# Patient Record
Sex: MALE | Race: WHITE | HISPANIC OR LATINO | Employment: OTHER | URBAN - METROPOLITAN AREA
[De-identification: names, ages, dates, MRNs, and addresses within clinical notes are randomized per-mention and may not be internally consistent; named-entity substitution may affect disease eponyms.]

---

## 2018-05-18 ENCOUNTER — APPOINTMENT (OUTPATIENT)
Dept: RADIOLOGY | Facility: MEDICAL CENTER | Age: 63
End: 2018-05-18
Attending: EMERGENCY MEDICINE
Payer: MEDICARE

## 2018-05-18 ENCOUNTER — HOSPITAL ENCOUNTER (EMERGENCY)
Facility: MEDICAL CENTER | Age: 63
End: 2018-05-18
Attending: EMERGENCY MEDICINE
Payer: MEDICARE

## 2018-05-18 VITALS
SYSTOLIC BLOOD PRESSURE: 127 MMHG | RESPIRATION RATE: 18 BRPM | WEIGHT: 185.19 LBS | DIASTOLIC BLOOD PRESSURE: 74 MMHG | BODY MASS INDEX: 32.81 KG/M2 | HEIGHT: 63 IN | OXYGEN SATURATION: 96 % | TEMPERATURE: 97.6 F | HEART RATE: 78 BPM

## 2018-05-18 DIAGNOSIS — R11.0 NAUSEA: ICD-10-CM

## 2018-05-18 DIAGNOSIS — R10.13 EPIGASTRIC PAIN: ICD-10-CM

## 2018-05-18 LAB
ALBUMIN SERPL BCP-MCNC: 4.5 G/DL (ref 3.2–4.9)
ALBUMIN/GLOB SERPL: 1.4 G/DL
ALP SERPL-CCNC: 66 U/L (ref 30–99)
ALT SERPL-CCNC: 24 U/L (ref 2–50)
ANION GAP SERPL CALC-SCNC: 10 MMOL/L (ref 0–11.9)
AST SERPL-CCNC: 28 U/L (ref 12–45)
BASOPHILS # BLD AUTO: 0.4 % (ref 0–1.8)
BASOPHILS # BLD: 0.02 K/UL (ref 0–0.12)
BILIRUB SERPL-MCNC: 0.5 MG/DL (ref 0.1–1.5)
BUN SERPL-MCNC: 11 MG/DL (ref 8–22)
CALCIUM SERPL-MCNC: 9.6 MG/DL (ref 8.4–10.2)
CHLORIDE SERPL-SCNC: 103 MMOL/L (ref 96–112)
CO2 SERPL-SCNC: 25 MMOL/L (ref 20–33)
CREAT SERPL-MCNC: 0.88 MG/DL (ref 0.5–1.4)
EKG IMPRESSION: NORMAL
EOSINOPHIL # BLD AUTO: 0.17 K/UL (ref 0–0.51)
EOSINOPHIL NFR BLD: 3 % (ref 0–6.9)
ERYTHROCYTE [DISTWIDTH] IN BLOOD BY AUTOMATED COUNT: 39.6 FL (ref 35.9–50)
GLOBULIN SER CALC-MCNC: 3.3 G/DL (ref 1.9–3.5)
GLUCOSE SERPL-MCNC: 145 MG/DL (ref 65–99)
HCT VFR BLD AUTO: 43.9 % (ref 42–52)
HGB BLD-MCNC: 15.8 G/DL (ref 14–18)
IMM GRANULOCYTES # BLD AUTO: 0.01 K/UL (ref 0–0.11)
IMM GRANULOCYTES NFR BLD AUTO: 0.2 % (ref 0–0.9)
LIPASE SERPL-CCNC: 32 U/L (ref 7–58)
LYMPHOCYTES # BLD AUTO: 2.43 K/UL (ref 1–4.8)
LYMPHOCYTES NFR BLD: 42.9 % (ref 22–41)
MCH RBC QN AUTO: 31.7 PG (ref 27–33)
MCHC RBC AUTO-ENTMCNC: 36 G/DL (ref 33.7–35.3)
MCV RBC AUTO: 88 FL (ref 81.4–97.8)
MONOCYTES # BLD AUTO: 0.39 K/UL (ref 0–0.85)
MONOCYTES NFR BLD AUTO: 6.9 % (ref 0–13.4)
NEUTROPHILS # BLD AUTO: 2.65 K/UL (ref 1.82–7.42)
NEUTROPHILS NFR BLD: 46.6 % (ref 44–72)
NRBC # BLD AUTO: 0 K/UL
NRBC BLD-RTO: 0 /100 WBC
PLATELET # BLD AUTO: 199 K/UL (ref 164–446)
PMV BLD AUTO: 8.4 FL (ref 9–12.9)
POTASSIUM SERPL-SCNC: 3.9 MMOL/L (ref 3.6–5.5)
PROT SERPL-MCNC: 7.8 G/DL (ref 6–8.2)
RBC # BLD AUTO: 4.99 M/UL (ref 4.7–6.1)
SODIUM SERPL-SCNC: 138 MMOL/L (ref 135–145)
TROPONIN I SERPL-MCNC: <0.02 NG/ML (ref 0–0.04)
WBC # BLD AUTO: 5.7 K/UL (ref 4.8–10.8)

## 2018-05-18 PROCEDURE — 85025 COMPLETE CBC W/AUTO DIFF WBC: CPT

## 2018-05-18 PROCEDURE — 70450 CT HEAD/BRAIN W/O DYE: CPT

## 2018-05-18 PROCEDURE — 93005 ELECTROCARDIOGRAM TRACING: CPT | Performed by: EMERGENCY MEDICINE

## 2018-05-18 PROCEDURE — 71045 X-RAY EXAM CHEST 1 VIEW: CPT

## 2018-05-18 PROCEDURE — 84484 ASSAY OF TROPONIN QUANT: CPT

## 2018-05-18 PROCEDURE — 83690 ASSAY OF LIPASE: CPT

## 2018-05-18 PROCEDURE — 99284 EMERGENCY DEPT VISIT MOD MDM: CPT

## 2018-05-18 PROCEDURE — 36415 COLL VENOUS BLD VENIPUNCTURE: CPT

## 2018-05-18 PROCEDURE — 80053 COMPREHEN METABOLIC PANEL: CPT

## 2018-05-18 RX ORDER — FINASTERIDE 5 MG/1
5 TABLET, FILM COATED ORAL EVERY EVENING
Status: SHIPPED | COMMUNITY
End: 2021-10-20

## 2018-05-18 RX ORDER — RANITIDINE 150 MG/1
150 TABLET ORAL DAILY
Status: SHIPPED | COMMUNITY
End: 2021-04-13

## 2018-05-18 RX ORDER — BUSPIRONE HYDROCHLORIDE 10 MG/1
10 TABLET ORAL 3 TIMES DAILY PRN
COMMUNITY

## 2018-05-18 RX ORDER — LISINOPRIL 5 MG/1
5 TABLET ORAL DAILY
COMMUNITY

## 2018-05-18 RX ORDER — SUCRALFATE 1 G/1
1 TABLET ORAL
Qty: 120 TAB | Refills: 0 | Status: SHIPPED | OUTPATIENT
Start: 2018-05-18 | End: 2021-10-20

## 2018-05-18 RX ORDER — DEXLANSOPRAZOLE 30 MG/1
30 CAPSULE, DELAYED RELEASE ORAL DAILY
Qty: 30 CAP | Refills: 0 | Status: SHIPPED | OUTPATIENT
Start: 2018-05-18 | End: 2021-10-20

## 2018-05-18 RX ORDER — ATORVASTATIN CALCIUM 40 MG/1
40 TABLET, FILM COATED ORAL NIGHTLY
COMMUNITY

## 2018-05-18 ASSESSMENT — PAIN SCALES - GENERAL
PAINLEVEL_OUTOF10: 3
PAINLEVEL_OUTOF10: 0

## 2018-05-18 NOTE — ED PROVIDER NOTES
ED Provider Note    CHIEF COMPLAINT  Chief Complaint   Patient presents with   • Nausea   • Headache       HPI  Kenyon Nunez is a 62 y.o. male who presents complaining of nausea and headache. The patient's been having some burning sensation in his chest and associated nausea for about 3 weeks. Initially saw his PCP for this and was diagnosed with GERD. He's been trying some Prilosec which is not really helping. He continues to have a nauseated feeling. He denies any other chest pain. He has no belly pain. Does not have any exertional symptoms. There is no shortness of breath or cough. Starting yesterday he started having a headache as well. This is gradually worsening, making his nausea worse. He denies any fever or recent illness. No change in bowel or bladder. His daughter thinks that he is little bit confused, he thinks he is fine. There is no other complaint.    PAST MEDICAL HISTORY  Past Medical History:   Diagnosis Date   • Backpain    • Diabetes    • Hyperlipidemia    • Hypertension    • Type II or unspecified type diabetes mellitus without mention of complication, uncontrolled 5/18/2010   • Vitiligo        FAMILY HISTORY  Family History   Problem Relation Age of Onset   • Diabetes Mother    • Hypertension Mother    • Psychiatry Father    • Diabetes Sister    • Diabetes Brother        SOCIAL HISTORY  Social History   Substance Use Topics   • Smoking status: Former Smoker     Years: 16.00     Types: Cigarettes     Quit date: 1/1/1990   • Smokeless tobacco: Never Used   • Alcohol use No         SURGICAL HISTORY  History reviewed. No pertinent surgical history.    CURRENT MEDICATIONS    I have reviewed the nurses notes and/or the list brought with the patient.    ALLERGIES  Allergies   Allergen Reactions   • Nkda [No Known Drug Allergy]        REVIEW OF SYSTEMS  See HPI for further details. Review of systems as above, otherwise all other systems are negative.     PHYSICAL EXAM  VITAL SIGNS: /81   Pulse 80   " Temp 36.4 °C (97.6 °F)   Resp 18   Ht 1.6 m (5' 3\") Comment: sTATED  Wt 84 kg (185 lb 3 oz)   SpO2 98%   BMI 32.80 kg/m²   Constitutional: Well appearing patient in no acute distress.  Not toxic, nor ill in appearance.  HENT: Mucus membranes moist.  Oropharynx is clear.  Eyes: Pupils equally round.  No scleral icterus.   Neck: Full nontender range of motion.  Lymphatic: No cervical lymphadenopathy noted.   Cardiovascular: Regular heart rate and rhythm.  No murmurs, rubs, nor gallop appreciated.   Thorax & Lungs: Chest is nontender.  Lungs are clear to auscultation with good air movement bilaterally.  No wheeze, rhonchi, nor rales.   Abdomen: Soft, with no tenderness, rebound nor guarding.  No mass, pulsatile mass, nor hepatosplenomegaly appreciated.  Skin: No purpura nor petechia noted.  Extremities/Musculoskeletal: No sign of trauma.  Calves are nontender with no cords nor edema.  No Bobbi's sign.  Pulses are intact all around.   Neurologic: Alert & oriented.  Strength and sensation is intact all around.  Gait is normal.  Psychiatric: Normal affect appropriate for the clinical situation.    EKG  I interpreted this EKG myself.  This is a 12-lead study.  The rhythm is sinus with a rate of 91.  There are no ST segment nor T wave abnormalities.  Interpretation: No ST segment elevation myocardial infarction.    LABS  Labs Reviewed   CBC WITH DIFFERENTIAL - Abnormal; Notable for the following:        Result Value    MCHC 36.0 (*)     MPV 8.4 (*)     Lymphocytes 42.90 (*)     All other components within normal limits   COMP METABOLIC PANEL - Abnormal; Notable for the following:     Glucose 145 (*)     All other components within normal limits   LIPASE   TROPONIN   ESTIMATED GFR         RADIOLOGY/PROCEDURES  I have reviewed the patient's film interpretations myself, and they are read out by the radiologist as:   DX-CHEST-PORTABLE (1 VIEW)   Final Result      No acute cardiopulmonary findings.      CT-HEAD W/O "   Final Result      1.  No acute intracranial findings.      2.  Chronic maxillary and ethmoid sinus disease.      3.  Mild periventricular white matter change, likely related to chronic small vessel disease.           .    MEDICAL RECORD  I have reviewed patient's medical record and pertinent results are listed above.    COURSE & MEDICAL DECISION MAKING  I have reviewed any medical record information, laboratory studies and radiographic results as noted above.  This patient presents with some epigastric discomfort and burning. His abdomen is completely benign. There is no evidence of pancreatitis or hepatitis. His exam is inconsistent with cholecystitis. There is no leukocytosis or shift, no renal insufficiency. Chest x-ray shows no evidence of infiltrate, no extra alveolar air/mediastinal air. Given his headache now with that nausea, I did obtain a CT scan looking for mass effect. There is not. Also, obviously consideration for cardiac etiology. However with weeks of symptoms and negative troponin, I feel that a cardiac etiology is ruled out. At this point, I would like him to change to Dexilant and Carafate as he is failing Prilosec and Zantac. Written prescriptions for these. A bike and a follow-up next week with his PCP for recheck. I would also like him to establish with GI, he was given a referral for [facility]. Vascular: Monday to make an appointment. He is given instructions on GERD.      FINAL IMPRESSION  1. Nausea    2. Epigastric pain           This dictation was created using voice recognition software.    Electronically signed by: Orlin Gaxiola, 5/18/2018 4:15 PM

## 2018-05-18 NOTE — ED NOTES
"PT C/O intermittent nausea with \"GERD\" for the past 3 weeks with H/A since yesterday. He has been seen by his \"primary\" in the interim with a preliminary diagnosis of gastric reflux.   "

## 2018-05-19 NOTE — ED NOTES
Med rec updated and complete  Allergies reviewed  Interviewed pt with family at bedside with permission from pt.  Pt reports no antibiotics in the last 30 days.  Pt reports that he stop taking the RANITIDINE 150MG 3 days again, because he thought it was making him very sick.

## 2018-05-19 NOTE — DISCHARGE INSTRUCTIONS
Start Dexilant and Carafate (stop Prilosec and Zantac).  Consider elevating the head of the bed a little.  Follow up next week with your PCP. I also want you to establish with GI--call Monday to make appt.    Gastroesophageal Reflux Disease, Adult  Normally, food travels down the esophagus and stays in the stomach to be digested. However, when a person has gastroesophageal reflux disease (GERD), food and stomach acid move back up into the esophagus. When this happens, the esophagus becomes sore and inflamed. Over time, GERD can create small holes (ulcers) in the lining of the esophagus.  What are the causes?  This condition is caused by a problem with the muscle between the esophagus and the stomach (lower esophageal sphincter, or LES). Normally, the LES muscle closes after food passes through the esophagus to the stomach. When the LES is weakened or abnormal, it does not close properly, and that allows food and stomach acid to go back up into the esophagus. The LES can be weakened by certain dietary substances, medicines, and medical conditions, including:  · Tobacco use.  · Pregnancy.  · Having a hiatal hernia.  · Heavy alcohol use.  · Certain foods and beverages, such as coffee, chocolate, onions, and peppermint.  What increases the risk?  This condition is more likely to develop in:  · People who have an increased body weight.  · People who have connective tissue disorders.  · People who use NSAID medicines.  What are the signs or symptoms?  Symptoms of this condition include:  · Heartburn.  · Difficult or painful swallowing.  · The feeling of having a lump in the throat.  · A bitter taste in the mouth.  · Bad breath.  · Having a large amount of saliva.  · Having an upset or bloated stomach.  · Belching.  · Chest pain.  · Shortness of breath or wheezing.  · Ongoing (chronic) cough or a night-time cough.  · Wearing away of tooth enamel.  · Weight loss.  Different conditions can cause chest pain. Make sure to see  your health care provider if you experience chest pain.  How is this diagnosed?  Your health care provider will take a medical history and perform a physical exam. To determine if you have mild or severe GERD, your health care provider may also monitor how you respond to treatment. You may also have other tests, including:  · An endoscopy to examine your stomach and esophagus with a small camera.  · A test that measures the acidity level in your esophagus.  · A test that measures how much pressure is on your esophagus.  · A barium swallow or modified barium swallow to show the shape, size, and functioning of your esophagus.  How is this treated?  The goal of treatment is to help relieve your symptoms and to prevent complications. Treatment for this condition may vary depending on how severe your symptoms are. Your health care provider may recommend:  · Changes to your diet.  · Medicine.  · Surgery.  Follow these instructions at home:  Diet  · Follow a diet as recommended by your health care provider. This may involve avoiding foods and drinks such as:  ¨ Coffee and tea (with or without caffeine).  ¨ Drinks that contain alcohol.  ¨ Energy drinks and sports drinks.  ¨ Carbonated drinks or sodas.  ¨ Chocolate and cocoa.  ¨ Peppermint and mint flavorings.  ¨ Garlic and onions.  ¨ Horseradish.  ¨ Spicy and acidic foods, including peppers, chili powder, solis powder, vinegar, hot sauces, and barbecue sauce.  ¨ Citrus fruit juices and citrus fruits, such as oranges, erma, and limes.  ¨ Tomato-based foods, such as red sauce, chili, salsa, and pizza with red sauce.  ¨ Fried and fatty foods, such as donuts, french fries, potato chips, and high-fat dressings.  ¨ High-fat meats, such as hot dogs and fatty cuts of red and white meats, such as rib eye steak, sausage, ham, and scott.  ¨ High-fat dairy items, such as whole milk, butter, and cream cheese.  · Eat small, frequent meals instead of large meals.  · Avoid drinking large  amounts of liquid with your meals.  · Avoid eating meals during the 2-3 hours before bedtime.  · Avoid lying down right after you eat.  · Do not exercise right after you eat.  General instructions  · Pay attention to any changes in your symptoms.  · Take over-the-counter and prescription medicines only as told by your health care provider. Do not take aspirin, ibuprofen, or other NSAIDs unless your health care provider told you to do so.  · Do not use any tobacco products, including cigarettes, chewing tobacco, and e-cigarettes. If you need help quitting, ask your health care provider.  · Wear loose-fitting clothing. Do not wear anything tight around your waist that causes pressure on your abdomen.  · Raise (elevate) the head of your bed 6 inches (15cm).  · Try to reduce your stress, such as with yoga or meditation. If you need help reducing stress, ask your health care provider.  · If you are overweight, reduce your weight to an amount that is healthy for you. Ask your health care provider for guidance about a safe weight loss goal.  · Keep all follow-up visits as told by your health care provider. This is important.  Contact a health care provider if:  · You have new symptoms.  · You have unexplained weight loss.  · You have difficulty swallowing, or it hurts to swallow.  · You have wheezing or a persistent cough.  · Your symptoms do not improve with treatment.  · You have a hoarse voice.  Get help right away if:  · You have pain in your arms, neck, jaw, teeth, or back.  · You feel sweaty, dizzy, or light-headed.  · You have chest pain or shortness of breath.  · You vomit and your vomit looks like blood or coffee grounds.  · You faint.  · Your stool is bloody or black.  · You cannot swallow, drink, or eat.  This information is not intended to replace advice given to you by your health care provider. Make sure you discuss any questions you have with your health care provider.  Document Released: 09/27/2006 Document  Revised: 05/17/2017 Document Reviewed: 04/13/2016  [employer] Interactive Patient Education © 2017 [employer]    Enfermedad por reflujo gastroesofágico en los adultos  (Gastroesophageal Reflux Disease, Adult)  Normalmente, los alimentos descienden por el esófago y se depositan en el estómago para mace digestión. Sin embargo, cuando cristofer persona tiene enfermedad por reflujo gastroesofágico (ERGE), los alimentos y el ácido estomacal regresan al esófago. Cuando esto ocurre, el esófago se irrita y se inflama. Con el tiempo, la ERGE puede provocar la formación de pequeñas perforaciones (úlceras) en la mucosa del esófago.  CAUSAS  Un problema del músculo que se encuentra entre el esófago y el estómago (esfínter esofágico inferior o EEI) es la causa de esta enfermedad. Por lo general, el esfínter esofágico inferior se ruslan después de que los alimentos pasan a través del esófago hacia el estómago. Cuando el EEI está debilitado o no es normal, no se ruslan correctamente, lo que permite el paso retrógrado de los alimentos y el ácido estomacal al esófago. Algunas sustancias de la dieta, algunos medicamentos y ciertas enfermedades pueden debilitar michael esfínter, entre ellos:  · Consumo de tabaco.  · Embarazo.  · Hernia de hiato.  · Consumo excesivo de alcohol.  · Algunos alimentos y ciertas bebidas, pardeep el café, el chocolate, las cebollas y la menta.  FACTORES DE RIESGO  Es más probable que esta afección se manifieste en:  · Los personas con sobrepeso.  · Las personas con trastornos del tejido conjuntivo.  · Las personas que viktoriya antiinflamatorios no esteroides (MARIOLA).  SÍNTOMAS  Los síntomas de esta afección incluyen lo siguiente:  · Acidez estomacal.  · Dificultad o dolor al tragar.  · Sensación de tener un bulto en la garganta.  · Sabor amargo en la boca.  · Mal aliento.  · Gran cantidad de saliva.  · Malestar estomacal o meteorismo.  · Flatulencias.  · Dolor en el pecho.  · Falta de aire o sibilancias.  · Tos permanente  (crónica) o tos nocturna.  · Desgaste el esmalte dental.  · Pérdida de peso.  El dolor en el pecho puede deberse a muchas afecciones diferentes. Consulte al médico si tiene dolor en el pecho.  DIAGNÓSTICO  El médico le hará cristofer historia clínica y un examen físico. Para determinar si la ERGE es leve o grave, el médico también puede controlar la respuesta al tratamiento. También pueden hacerle otros estudios, por ejemplo:  · Cristofer endoscopia para examinar el estómago y el esófago con cristofer pequeña cámara.  · Un estudio que determina el nivel de acidez en el esófago.  · Un estudio que mide la presión que hay en el esófago.  · Un estudio de deglución de bario o un estudio modificado de deglución de bario para mostrar la forma, el tamaño y el funcionamiento del esófago.  TRATAMIENTO  El objetivo del tratamiento es aliviar los síntomas y evitar las complicaciones. El tratamiento de esta afección puede variar en función de la gravedad de los síntomas. El médico podrá indicar lo siguiente:  · Cambios en la dieta.  · Medicamentos.  · Cirugía.  INSTRUCCIONES PARA EL CUIDADO EN EL HOGAR  Dieta   · Siga la dieta que le haya recomendado el médico, la cual puede incluir evitar alimentos y bebidas tales pardeep:  ¨ Café y té (con o sin cafeína).  ¨ Bebidas que contengan alcohol.  ¨ Bebidas energizantes y deportivas.  ¨ Gaseosas o refrescos.  ¨ Chocolate y cacao.  ¨ Menta y esencias de menta.  ¨ [place] y cebollas.  ¨ Rábano picante.  ¨ Alimentos muy condimentados y ácidos, entre ellos, pimientos, chile en polvo, solis en polvo, vinagre, salsas picantes y salsa barbacoa.  ¨ Frutas cítricas y janae jugos, pardeep naranjas, teagan y leny.  ¨ Alimentos a base de tomates, pardeep salsa kody, chile, salsa y pizza con salsa kody.  ¨ Alimentos fritos y grasos, pardeep rosquillas, pam fritas y aderezos con alto contenido de grasa.  ¨ Trev con alto contenido de grasa, pardeep hot dogs y baldwin grasos de trev sanon y hiren, por ejemplo, filetes de  entrecot, salchicha, jamón y tocino.  ¨ Productos lácteos con alto contenido de grasa, pardeep leche entera, mantequilla y queso crema.  · Liseth comidas pequeñas y frecuentes patricia el día en lugar de comidas abundantes.  · Evite beber mucho líquido con las comidas.  · No coma patricia las 2 o 3 horas previas a la hora de acostarse.  · No se acueste inmediatamente después de comer.  · No liseth actividad física enseguida después de comer.  Instrucciones generales   · Esté atento a cualquier cambio en los síntomas.  · [place] los medicamentos de venta oz y los recetados solamente pardeep se lo haya indicado el médico. No tome aspirina, ibuprofeno ni otros antiinflamatorios no esteroides (MARIOLA), a menos que se lo haya indicado el médico.  · No consuma ningún producto que contenga tabaco, lo que incluye cigarrillos, tabaco de mascar y cigarrillos electrónicos. Si necesita ayuda para dejar de fumar, consulte al médico.  · Use ropas sueltas. No use prendas ajustadas alrededor de la cintura que ejerzan presión en el abdomen.  · Levante (eleve) 6 pulgadas (15 centímetros) la cabecera de la cama.  · Trate de reducir el nivel de estrés con actividades pardeep el yoga o la meditación. Si necesita ayuda para reducir el nivel de estrés, consulte al médico.  · Si tiene sobrepeso, adelgace hasta alcanzar un peso saludable. Hable con el médico acerca de mace peso ideal y pídale asesoramiento en cuanto a la dieta que debe seguir para poder alcanzarlo.  · Concurra a todas las visitas de control pardeep se lo haya indicado el médico. [place] es importante.  SOLICITE ATENCIÓN MÉDICA SI:  · Aparecen nuevos síntomas.  · [place] de peso sin causa aparente.  · Tiene dificultad para tragar o siente dolor al hacerlo.  · Tiene sibilancias o tos persistente.  · Los síntomas no mejoran con el tratamiento.  · Tiene la voz ronca.  SOLICITE ATENCIÓN MÉDICA DE INMEDIATO SI:  · Tiene dolor en los brazos, el argelia, los maxilares, la dentadura o la espalda.  · Transpira,  se marea o tiene sensación de desvanecimiento.  · Siente falta de aire o dolor en el pecho.  · Vomita y el vómito es parecido a la amanda o a los granos de café.  · Se desmaya.  · Las heces son sanguinolentas o de color jose c.  · No puede tragar, beber o comer.  Esta información no tiene pardeep fin reemplazar el consejo del médico. Asegúrese de hacerle al médico cualquier pregunta que tenga.  Document Released: 09/27/2006 Document Revised: 09/07/2016 Document Reviewed: 04/13/2016  [employer] Interactive Patient Education © 2017 [employer]

## 2021-03-03 DIAGNOSIS — Z23 NEED FOR VACCINATION: ICD-10-CM

## 2021-04-02 ENCOUNTER — APPOINTMENT (OUTPATIENT)
Dept: RADIOLOGY | Facility: MEDICAL CENTER | Age: 66
End: 2021-04-02
Attending: EMERGENCY MEDICINE
Payer: MEDICARE

## 2021-04-02 ENCOUNTER — HOSPITAL ENCOUNTER (EMERGENCY)
Facility: MEDICAL CENTER | Age: 66
End: 2021-04-02
Attending: EMERGENCY MEDICINE
Payer: MEDICARE

## 2021-04-02 VITALS
TEMPERATURE: 97.3 F | WEIGHT: 181.44 LBS | OXYGEN SATURATION: 93 % | HEART RATE: 71 BPM | RESPIRATION RATE: 18 BRPM | HEIGHT: 63 IN | DIASTOLIC BLOOD PRESSURE: 79 MMHG | SYSTOLIC BLOOD PRESSURE: 148 MMHG | BODY MASS INDEX: 32.15 KG/M2

## 2021-04-02 DIAGNOSIS — K57.90 DIVERTICULOSIS: ICD-10-CM

## 2021-04-02 DIAGNOSIS — R74.8 ELEVATED LIPASE: ICD-10-CM

## 2021-04-02 DIAGNOSIS — R10.30 LOWER ABDOMINAL PAIN: ICD-10-CM

## 2021-04-02 LAB
ALBUMIN SERPL BCP-MCNC: 4.5 G/DL (ref 3.2–4.9)
ALBUMIN/GLOB SERPL: 1.5 G/DL
ALP SERPL-CCNC: 87 U/L (ref 30–99)
ALT SERPL-CCNC: 22 U/L (ref 2–50)
ANION GAP SERPL CALC-SCNC: 14 MMOL/L (ref 7–16)
APPEARANCE UR: ABNORMAL
AST SERPL-CCNC: 17 U/L (ref 12–45)
BACTERIA #/AREA URNS HPF: NEGATIVE /HPF
BASOPHILS # BLD AUTO: 0.5 % (ref 0–1.8)
BASOPHILS # BLD: 0.03 K/UL (ref 0–0.12)
BILIRUB SERPL-MCNC: 0.3 MG/DL (ref 0.1–1.5)
BILIRUB UR QL STRIP.AUTO: NEGATIVE
BUN SERPL-MCNC: 17 MG/DL (ref 8–22)
CALCIUM SERPL-MCNC: 9.7 MG/DL (ref 8.5–10.5)
CHLORIDE SERPL-SCNC: 102 MMOL/L (ref 96–112)
CO2 SERPL-SCNC: 22 MMOL/L (ref 20–33)
COLOR UR: YELLOW
CREAT SERPL-MCNC: 0.81 MG/DL (ref 0.5–1.4)
EKG IMPRESSION: NORMAL
EOSINOPHIL # BLD AUTO: 0.14 K/UL (ref 0–0.51)
EOSINOPHIL NFR BLD: 2.2 % (ref 0–6.9)
EPI CELLS #/AREA URNS HPF: NEGATIVE /HPF
ERYTHROCYTE [DISTWIDTH] IN BLOOD BY AUTOMATED COUNT: 42.4 FL (ref 35.9–50)
GLOBULIN SER CALC-MCNC: 3.1 G/DL (ref 1.9–3.5)
GLUCOSE SERPL-MCNC: 104 MG/DL (ref 65–99)
GLUCOSE UR STRIP.AUTO-MCNC: >=1000 MG/DL
HCT VFR BLD AUTO: 47.9 % (ref 42–52)
HGB BLD-MCNC: 16.4 G/DL (ref 14–18)
HYALINE CASTS #/AREA URNS LPF: ABNORMAL /LPF
IMM GRANULOCYTES # BLD AUTO: 0.03 K/UL (ref 0–0.11)
IMM GRANULOCYTES NFR BLD AUTO: 0.5 % (ref 0–0.9)
KETONES UR STRIP.AUTO-MCNC: ABNORMAL MG/DL
LEUKOCYTE ESTERASE UR QL STRIP.AUTO: NEGATIVE
LIPASE SERPL-CCNC: 246 U/L (ref 11–82)
LYMPHOCYTES # BLD AUTO: 2.18 K/UL (ref 1–4.8)
LYMPHOCYTES NFR BLD: 34.6 % (ref 22–41)
MCH RBC QN AUTO: 31.4 PG (ref 27–33)
MCHC RBC AUTO-ENTMCNC: 34.2 G/DL (ref 33.7–35.3)
MCV RBC AUTO: 91.8 FL (ref 81.4–97.8)
MICRO URNS: ABNORMAL
MONOCYTES # BLD AUTO: 0.46 K/UL (ref 0–0.85)
MONOCYTES NFR BLD AUTO: 7.3 % (ref 0–13.4)
NEUTROPHILS # BLD AUTO: 3.46 K/UL (ref 1.82–7.42)
NEUTROPHILS NFR BLD: 54.9 % (ref 44–72)
NITRITE UR QL STRIP.AUTO: NEGATIVE
NRBC # BLD AUTO: 0 K/UL
NRBC BLD-RTO: 0 /100 WBC
PH UR STRIP.AUTO: 5 [PH] (ref 5–8)
PLATELET # BLD AUTO: 195 K/UL (ref 164–446)
PMV BLD AUTO: 8.6 FL (ref 9–12.9)
POTASSIUM SERPL-SCNC: 4.1 MMOL/L (ref 3.6–5.5)
PROT SERPL-MCNC: 7.6 G/DL (ref 6–8.2)
PROT UR QL STRIP: NEGATIVE MG/DL
RBC # BLD AUTO: 5.22 M/UL (ref 4.7–6.1)
RBC # URNS HPF: ABNORMAL /HPF
RBC UR QL AUTO: NEGATIVE
SODIUM SERPL-SCNC: 138 MMOL/L (ref 135–145)
SP GR UR STRIP.AUTO: 1.04
TROPONIN T SERPL-MCNC: 11 NG/L (ref 6–19)
UROBILINOGEN UR STRIP.AUTO-MCNC: 0.2 MG/DL
WBC # BLD AUTO: 6.3 K/UL (ref 4.8–10.8)
WBC #/AREA URNS HPF: ABNORMAL /HPF

## 2021-04-02 PROCEDURE — 81001 URINALYSIS AUTO W/SCOPE: CPT

## 2021-04-02 PROCEDURE — 93005 ELECTROCARDIOGRAM TRACING: CPT | Performed by: EMERGENCY MEDICINE

## 2021-04-02 PROCEDURE — 99284 EMERGENCY DEPT VISIT MOD MDM: CPT

## 2021-04-02 PROCEDURE — 36415 COLL VENOUS BLD VENIPUNCTURE: CPT

## 2021-04-02 PROCEDURE — 83690 ASSAY OF LIPASE: CPT

## 2021-04-02 PROCEDURE — 74177 CT ABD & PELVIS W/CONTRAST: CPT | Mod: MG

## 2021-04-02 PROCEDURE — 85025 COMPLETE CBC W/AUTO DIFF WBC: CPT

## 2021-04-02 PROCEDURE — 80053 COMPREHEN METABOLIC PANEL: CPT

## 2021-04-02 PROCEDURE — A9270 NON-COVERED ITEM OR SERVICE: HCPCS | Performed by: EMERGENCY MEDICINE

## 2021-04-02 PROCEDURE — 84484 ASSAY OF TROPONIN QUANT: CPT

## 2021-04-02 PROCEDURE — 700117 HCHG RX CONTRAST REV CODE 255: Performed by: EMERGENCY MEDICINE

## 2021-04-02 PROCEDURE — 700102 HCHG RX REV CODE 250 W/ 637 OVERRIDE(OP): Performed by: EMERGENCY MEDICINE

## 2021-04-02 PROCEDURE — 93005 ELECTROCARDIOGRAM TRACING: CPT

## 2021-04-02 RX ORDER — DICYCLOMINE HCL 20 MG
20 TABLET ORAL 4 TIMES DAILY PRN
Qty: 30 TABLET | Refills: 0 | Status: SHIPPED | OUTPATIENT
Start: 2021-04-02 | End: 2021-10-20

## 2021-04-02 RX ORDER — OXYCODONE AND ACETAMINOPHEN 10; 325 MG/1; MG/1
1 TABLET ORAL ONCE
Status: COMPLETED | OUTPATIENT
Start: 2021-04-02 | End: 2021-04-02

## 2021-04-02 RX ORDER — AMOXICILLIN AND CLAVULANATE POTASSIUM 875; 125 MG/1; MG/1
1 TABLET, FILM COATED ORAL 2 TIMES DAILY
Qty: 14 TABLET | Refills: 0 | Status: SHIPPED | OUTPATIENT
Start: 2021-04-02 | End: 2021-04-09

## 2021-04-02 RX ADMIN — OXYCODONE HYDROCHLORIDE AND ACETAMINOPHEN 1 TABLET: 10; 325 TABLET ORAL at 16:08

## 2021-04-02 RX ADMIN — IOHEXOL 90 ML: 350 INJECTION, SOLUTION INTRAVENOUS at 17:30

## 2021-04-02 NOTE — ED PROVIDER NOTES
ED Provider Note    ED Provider Note    Primary care provider: Yan Powell D.O.  Means of arrival: Walk-in  History obtained from: Patient via the translation tablet    CHIEF COMPLAINT  Chief Complaint   Patient presents with   • Abdominal Pain     x 2 wks    • Groin Pain   • Nausea   • Urinary Frequency     Seen at 3:44 PM.   HPI  Kenyon Hewitt is a 65 y.o. male with diabetes, history of MI, CVA (per patient) COVID-19 infection treated in [place] in 2020 presents with abdominal pain.  The patient notes for 2 weeks intermittent abdominal pain that ranges from mild to severe, it begins in the periumbilical region, spreads down to the upper groin and occasionally radiates to the bilateral flanks.  The pain is currently present in the periumbilical region, the patient does not note any modifying factors to this.  Over this time the patient has not had dysuria as mentioned in the nursing note, rather when he urinates this causes some increased pain in the periumbilical region.    The patient denies any headache, sore throat, neck pain.  He has had chest pain for the past 3 days, this is intermittent lasting for about 5 to 15 minutes at a time, sometimes associated with lightheadedness.  He denies any shortness of breath.  He occasionally has nausea but has not had any vomiting.  He denies any diarrhea.  He denies any numbness or focal weakness.    All of the history is obtained the translation tablet, therefore there may be some nuances that were missed due to this..    REVIEW OF SYSTEMS  See HPI,   Remainder of ROS negative.     PAST MEDICAL HISTORY   has a past medical history of Backpain, Diabetes, Hyperlipidemia, Hypertension, Type II or unspecified type diabetes mellitus without mention of complication, uncontrolled (5/18/2010), and Vitiligo.    SURGICAL HISTORY  patient denies any surgical history    SOCIAL HISTORY  Social History     Tobacco Use   • Smoking status: Former Smoker     Years: 16.00  "    Types: Cigarettes     Quit date: 1990     Years since quittin.2   • Smokeless tobacco: Never Used   Substance Use Topics   • Alcohol use: No   • Drug use: No      Social History     Substance and Sexual Activity   Drug Use No       FAMILY HISTORY  Family History   Problem Relation Age of Onset   • Diabetes Mother    • Hypertension Mother    • Psychiatric Illness Father    • Diabetes Sister    • Diabetes Brother        CURRENT MEDICATIONS  Reviewed.  See Encounter Summary.     ALLERGIES  Allergies   Allergen Reactions   • Nkda [No Known Drug Allergy]        PHYSICAL EXAM  VITAL SIGNS: /69   Pulse 71   Temp 36.2 °C (97.1 °F) (Temporal)   Resp 19   Ht 1.6 m (5' 3\")   Wt 82.3 kg (181 lb 7 oz)   SpO2 93%   BMI 32.14 kg/m²   Constitutional: Awake, alert in no apparent distress.  HENT: Normocephalic, Bilateral external ears normal. Nose normal.   Eyes: Conjunctiva normal, non-icteric, EOMI.    Thorax & Lungs: Easy unlabored respirations, Clear to ascultation bilaterally.  Cardiovascular: Regular rate, Regular rhythm, No murmurs, rubs or gallops. Bilateral pulses symmetrical.   Abdomen:  Soft, obese, periumbilical, suprapubic tenderness, no epigastric or right upper quadrant tenderness, nondistended, normal active bowel sounds.   :    Skin: Visualized skin is  Dry, No erythema, No rash.   Musculoskeletal:   No cyanosis, clubbing or edema. No leg asymmetry.   Neurologic: Alert, Grossly non-focal.   Psychiatric: Normal affect, Normal mood  Lymphatic:  No cervical LAD    EKG   12 lead Interpreted by me  Rhythm:  Normal sinus rhythm   Rate: 77  Axis: normal  Ectopy: none  Conduction: normal  ST Segments: no acute change  T Waves: no acute change  Clinical Impression: Normal EKG without acute changes     RADIOLOGY  CT-ABDOMEN-PELVIS WITH   Final Result      Diverticulosis without evidence of diverticulitis.            COURSE & MEDICAL DECISION MAKING  Pertinent Labs & Imaging studies reviewed. (See " chart for details)    Differential diagnoses include but are not limited to: Pancreatitis, diverticulitis, abdominal pain NOS    3:44 PM - Medical record reviewed, patient seen and examined at bedside.    5:41 PM-repeat abdominal examination, the patient is nontender.  Reviewed the labs and test results using the translation tablet.    Decision Making:  This is a pleasant 65 y.o. year old male who presents with abdominal pain as well as chest pain.  The locations of pain did not appear to be related.  Patient is currently not having any chest pain.  EKG does not show acute ischemic changes.  Troponin is not elevated.  Heart score is moderate.  Do not suspect PE.  No suggestion of aortic dissection.  This can be followed up as an outpatient.  ACS seems very unlikely at this time.    The other issue today is abdominal pain.  This is the primary reason for the patient's presentation today.  He did have reproducible abdominal pain in the umbilical region in the suprapubic region.  Urinalysis does not show any evidence of infection, the patient does have some glucosuria.  Lipase is elevated today but this is nonspecific.  Also the patient is not have any pain rating to the back, nor is he having pain in the upper epigastrium, therefore this does not appear to be pancreatitis.  In addition to this the pancreas appears normal on the CT scan.  The patient does have some diverticular disease, the location of his pain is lower in the abdomen which is more consistent with diverticulitis.  There is no evidence of inflammation surrounding the diverticula.  Perhaps this is a very mild presentation.  In any case I do not see an acute surgical or medical emergency.  I will place the patient on some Augmentin in the case of diverticulitis, recommend he follow-up with primary care physician for further management.    Discharge Medications:  New Prescriptions    AMOXICILLIN-CLAVULANATE (AUGMENTIN) 875-125 MG TAB    Take 1 tablet by  mouth 2 times a day for 7 days.    DICYCLOMINE (BENTYL) 20 MG TAB    Take 1 tablet by mouth 4 times a day as needed (abdominal pain).       The patient was discharged home (see d/c instructions) was told to return immediately for any signs or symptoms listed, or any worsening at all.  The patient verbally agreed to the discharge precautions and follow-up plan which is documented in EPIC.        FINAL IMPRESSION  1. Lower abdominal pain    2. Diverticulosis    3. Elevated lipase

## 2021-04-02 NOTE — ED TRIAGE NOTES
Pt to triage .  Chief Complaint   Patient presents with   • Abdominal Pain     x 2 wks    • Groin Pain   • Nausea   • Urinary Frequency

## 2021-04-03 NOTE — ED NOTES
Pt given discharge instructions/prescriptions sent to pharm of choosing/ home care instructions explained, pt verbalized understanding of instructions given/pt understands the importance of follow up, pt ambulatory to ER yuriy.

## 2021-04-13 ENCOUNTER — APPOINTMENT (OUTPATIENT)
Dept: RADIOLOGY | Facility: MEDICAL CENTER | Age: 66
End: 2021-04-13
Attending: EMERGENCY MEDICINE
Payer: MEDICARE

## 2021-04-13 ENCOUNTER — HOSPITAL ENCOUNTER (EMERGENCY)
Facility: MEDICAL CENTER | Age: 66
End: 2021-04-13
Attending: EMERGENCY MEDICINE
Payer: MEDICARE

## 2021-04-13 VITALS
DIASTOLIC BLOOD PRESSURE: 70 MMHG | WEIGHT: 182.98 LBS | OXYGEN SATURATION: 94 % | TEMPERATURE: 97.1 F | RESPIRATION RATE: 16 BRPM | HEIGHT: 63 IN | BODY MASS INDEX: 32.42 KG/M2 | SYSTOLIC BLOOD PRESSURE: 114 MMHG | HEART RATE: 70 BPM

## 2021-04-13 DIAGNOSIS — K20.90 ESOPHAGITIS: ICD-10-CM

## 2021-04-13 DIAGNOSIS — K29.00 ACUTE GASTRITIS WITHOUT HEMORRHAGE, UNSPECIFIED GASTRITIS TYPE: ICD-10-CM

## 2021-04-13 LAB
ALBUMIN SERPL BCP-MCNC: 4.5 G/DL (ref 3.2–4.9)
ALBUMIN/GLOB SERPL: 1.5 G/DL
ALP SERPL-CCNC: 87 U/L (ref 30–99)
ALT SERPL-CCNC: 17 U/L (ref 2–50)
ANION GAP SERPL CALC-SCNC: 11 MMOL/L (ref 7–16)
APPEARANCE UR: CLEAR
AST SERPL-CCNC: 17 U/L (ref 12–45)
BASOPHILS # BLD AUTO: 0.5 % (ref 0–1.8)
BASOPHILS # BLD: 0.02 K/UL (ref 0–0.12)
BILIRUB SERPL-MCNC: 0.5 MG/DL (ref 0.1–1.5)
BILIRUB UR QL STRIP.AUTO: NEGATIVE
BUN SERPL-MCNC: 17 MG/DL (ref 8–22)
CALCIUM SERPL-MCNC: 9.8 MG/DL (ref 8.4–10.2)
CHLORIDE SERPL-SCNC: 100 MMOL/L (ref 96–112)
CO2 SERPL-SCNC: 25 MMOL/L (ref 20–33)
COLOR UR: YELLOW
CREAT SERPL-MCNC: 0.78 MG/DL (ref 0.5–1.4)
EKG IMPRESSION: NORMAL
EOSINOPHIL # BLD AUTO: 0.1 K/UL (ref 0–0.51)
EOSINOPHIL NFR BLD: 2.3 % (ref 0–6.9)
ERYTHROCYTE [DISTWIDTH] IN BLOOD BY AUTOMATED COUNT: 41 FL (ref 35.9–50)
GLOBULIN SER CALC-MCNC: 3 G/DL (ref 1.9–3.5)
GLUCOSE SERPL-MCNC: 158 MG/DL (ref 65–99)
GLUCOSE UR STRIP.AUTO-MCNC: >=1000 MG/DL
HCT VFR BLD AUTO: 45.9 % (ref 42–52)
HGB BLD-MCNC: 15.9 G/DL (ref 14–18)
IMM GRANULOCYTES # BLD AUTO: 0.01 K/UL (ref 0–0.11)
IMM GRANULOCYTES NFR BLD AUTO: 0.2 % (ref 0–0.9)
KETONES UR STRIP.AUTO-MCNC: ABNORMAL MG/DL
LEUKOCYTE ESTERASE UR QL STRIP.AUTO: NEGATIVE
LIPASE SERPL-CCNC: 70 U/L (ref 7–58)
LYMPHOCYTES # BLD AUTO: 1.63 K/UL (ref 1–4.8)
LYMPHOCYTES NFR BLD: 37 % (ref 22–41)
MCH RBC QN AUTO: 31.6 PG (ref 27–33)
MCHC RBC AUTO-ENTMCNC: 34.6 G/DL (ref 33.7–35.3)
MCV RBC AUTO: 91.3 FL (ref 81.4–97.8)
MICRO URNS: ABNORMAL
MONOCYTES # BLD AUTO: 0.29 K/UL (ref 0–0.85)
MONOCYTES NFR BLD AUTO: 6.6 % (ref 0–13.4)
NEUTROPHILS # BLD AUTO: 2.36 K/UL (ref 1.82–7.42)
NEUTROPHILS NFR BLD: 53.4 % (ref 44–72)
NITRITE UR QL STRIP.AUTO: NEGATIVE
NRBC # BLD AUTO: 0 K/UL
NRBC BLD-RTO: 0 /100 WBC
PH UR STRIP.AUTO: 5.5 [PH] (ref 5–8)
PLATELET # BLD AUTO: 222 K/UL (ref 164–446)
PMV BLD AUTO: 8.6 FL (ref 9–12.9)
POTASSIUM SERPL-SCNC: 4.2 MMOL/L (ref 3.6–5.5)
PROT SERPL-MCNC: 7.5 G/DL (ref 6–8.2)
PROT UR QL STRIP: NEGATIVE MG/DL
RBC # BLD AUTO: 5.03 M/UL (ref 4.7–6.1)
RBC UR QL AUTO: NEGATIVE
SODIUM SERPL-SCNC: 136 MMOL/L (ref 135–145)
SP GR UR STRIP.AUTO: 1.01
TROPONIN T SERPL-MCNC: 15 NG/L (ref 6–19)
WBC # BLD AUTO: 4.4 K/UL (ref 4.8–10.8)

## 2021-04-13 PROCEDURE — 99284 EMERGENCY DEPT VISIT MOD MDM: CPT

## 2021-04-13 PROCEDURE — 76705 ECHO EXAM OF ABDOMEN: CPT

## 2021-04-13 PROCEDURE — 96374 THER/PROPH/DIAG INJ IV PUSH: CPT

## 2021-04-13 PROCEDURE — 85025 COMPLETE CBC W/AUTO DIFF WBC: CPT

## 2021-04-13 PROCEDURE — 96375 TX/PRO/DX INJ NEW DRUG ADDON: CPT

## 2021-04-13 PROCEDURE — 700102 HCHG RX REV CODE 250 W/ 637 OVERRIDE(OP): Performed by: EMERGENCY MEDICINE

## 2021-04-13 PROCEDURE — 700111 HCHG RX REV CODE 636 W/ 250 OVERRIDE (IP): Performed by: EMERGENCY MEDICINE

## 2021-04-13 PROCEDURE — 80053 COMPREHEN METABOLIC PANEL: CPT

## 2021-04-13 PROCEDURE — 83690 ASSAY OF LIPASE: CPT

## 2021-04-13 PROCEDURE — 81003 URINALYSIS AUTO W/O SCOPE: CPT

## 2021-04-13 PROCEDURE — A9270 NON-COVERED ITEM OR SERVICE: HCPCS | Performed by: EMERGENCY MEDICINE

## 2021-04-13 PROCEDURE — 93005 ELECTROCARDIOGRAM TRACING: CPT | Performed by: EMERGENCY MEDICINE

## 2021-04-13 PROCEDURE — 84484 ASSAY OF TROPONIN QUANT: CPT

## 2021-04-13 RX ORDER — ONDANSETRON 2 MG/ML
4 INJECTION INTRAMUSCULAR; INTRAVENOUS ONCE
Status: COMPLETED | OUTPATIENT
Start: 2021-04-13 | End: 2021-04-13

## 2021-04-13 RX ORDER — MORPHINE SULFATE 4 MG/ML
4 INJECTION, SOLUTION INTRAMUSCULAR; INTRAVENOUS ONCE
Status: COMPLETED | OUTPATIENT
Start: 2021-04-13 | End: 2021-04-13

## 2021-04-13 RX ORDER — AMOXICILLIN AND CLAVULANATE POTASSIUM 875; 125 MG/1; MG/1
1 TABLET, FILM COATED ORAL 2 TIMES DAILY
Status: SHIPPED | COMMUNITY
Start: 2021-04-05 | End: 2021-10-20

## 2021-04-13 RX ADMIN — MORPHINE SULFATE 4 MG: 4 INJECTION INTRAVENOUS at 10:02

## 2021-04-13 RX ADMIN — ONDANSETRON 4 MG: 2 INJECTION INTRAMUSCULAR; INTRAVENOUS at 10:02

## 2021-04-13 RX ADMIN — LIDOCAINE HYDROCHLORIDE 30 ML: 20 SOLUTION OROPHARYNGEAL at 12:01

## 2021-04-13 ASSESSMENT — FIBROSIS 4 INDEX: FIB4 SCORE: 1.25

## 2021-04-13 ASSESSMENT — PAIN DESCRIPTION - PAIN TYPE: TYPE: ACUTE PAIN

## 2021-04-13 NOTE — ED PROVIDER NOTES
ED Provider Note    CHIEF COMPLAINT  Chief Complaint   Patient presents with   • Abdominal Pain     Pt reports continues to have generalized abd pain s/p previous visit   • Flank Pain     Right       HPI  Kenyon Hewitt is a 65 y.o. male who presents with a report that he continues to have generalized abdominal pain right greater than left and upper greater than lower that seems to be not improving with omeprazole and diet control.  He says that he does get a lot of reflux symptoms still and the pain seems to be worse with food.  He was seen back on April 2 and had CAT scan of the abdomen and pelvis which did not show any evidence of pancreatic pseudocyst as his lipase was somewhat elevated at 246.  He says that he is not a big drinker and is followed by [facility] for his primary care and does not have a history of having endoscopy and does not have a GI specialist.  Patient does have a history of diabetes that is only moderately controlled and has significant glucose urea by history.  States that his pain is moderate to severe, localized more on the right upper quadrant radiating to his back at times.  Has been nauseated but no vomiting and denies any fever, chills or sweats    REVIEW OF SYSTEMS  See HPI for further details. All other systems are negative.     PAST MEDICAL HISTORY  Past Medical History:   Diagnosis Date   • Backpain    • Diabetes    • Diverticulosis    • Hyperlipidemia    • Hypertension    • Type II or unspecified type diabetes mellitus without mention of complication, uncontrolled 5/18/2010   • Vitiligo        FAMILY HISTORY  Family History   Problem Relation Age of Onset   • Diabetes Mother    • Hypertension Mother    • Psychiatric Illness Father    • Diabetes Sister    • Diabetes Brother        SOCIAL HISTORY   reports that he quit smoking about 31 years ago. His smoking use included cigarettes. He quit after 16.00 years of use. He has never used smokeless tobacco.  "He reports that he does not drink alcohol and does not use drugs.    SURGICAL HISTORY  History reviewed. No pertinent surgical history.    CURRENT MEDICATIONS  Home Medications    **Home medications have not yet been reviewed for this encounter**         ALLERGIES  Allergies   Allergen Reactions   • Nkda [No Known Drug Allergy]        PHYSICAL EXAM  VITAL SIGNS: /92   Pulse 81   Temp 36.7 °C (98 °F) (Temporal)   Resp 15   Ht 1.6 m (5' 3\")   Wt 83 kg (182 lb 15.7 oz)   SpO2 95%   BMI 32.41 kg/m²    Constitutional: Well developed, Well nourished, No acute distress, Non-toxic appearance.   HENT: Normocephalic, Atraumatic, Bilateral external ears normal, Oropharynx is clear mucous membranes are moist. No oral exudates or nasal discharge.   Eyes: Pupils are equal round and reactive, EOMI, Conjunctiva normal, No discharge.   Neck: Normal range of motion, No tenderness, Supple, No stridor. No meningismus.  Lymphatic: No lymphadenopathy noted.   Cardiovascular: Regular rate and rhythm without murmur rub or gallop.  Thorax & Lungs: Clear breath sounds bilaterally without wheezes, rhonchi or rales. There is no chest wall tenderness.   Abdomen: Soft non-tender non-distended. There is no rebound or guarding. No organomegaly is appreciated. Bowel sounds are normal.  Skin: Normal without rash.   Back: No CVA or spinal tenderness.   Extremities: Intact distal pulses, No edema, No tenderness, No cyanosis, No clubbing. Capillary refill is less than 2 seconds.  Musculoskeletal: Good range of motion in all major joints. No tenderness to palpation or major deformities noted.   Neurologic: Alert & oriented x 3, Normal motor function, Normal sensory function, No focal deficits noted. Reflexes are normal.  Psychiatric: Affect normal, Judgment normal, Mood normal. There is no suicidal ideation or patient reported hallucinations.     EKG  Results for orders placed or performed during the hospital encounter of 04/13/21   EKG "   Result Value Ref Range    Report       [facility] Emergency Dept.    Test Date:  2021  Pt Name:    NAZANIN COMBS          Department: [facility]  MRN:        [id number]                      Room:       -ROOM 5  Gender:     Male                         Technician: [id number]  :        1955                   Requested By:JOSÉ LUIS BACK  Order #:    [id number]                    Reading MD: JORDON LUX MD    Measurements  Intervals                                Axis  Rate:       70                           P:          55  CT:         184                          QRS:        26  QRSD:       80                           T:          41  QT:         360  QTc:        389    Interpretive Statements  SINUS RHYTHM  Compared to ECG 2021 11:58:15  No significant changes  Electronically Signed On 2021 10:09:53 PDT by JORDON LUX MD           RADIOLOGY/PROCEDURES  US-RUQ   Final Result      1.  Mildly echogenic liver, a nonspecific finding that often is found to represent steatosis.  Other infiltrative processes could have an identical appearance      2.  Cystic process in the right anterior pararenal space is most likely fluid-filled duodenum favored strongly over cyst related to the pancreas head. Correlation with pancreas enzymes is recommended to confirm      3.  Simple right renal cyst does not require follow-up            COURSE & MEDICAL DECISION MAKING  Pertinent Labs & Imaging studies reviewed. (See chart for details)  This patient has a fairly vague presentation of abdominal pain however seems to be mostly located in the right upper quadrant today on exam and I reviewed his imaging which does not include ultrasound and therefore ordered read for quadrant ultrasound to evaluate his biliary tree especially given his lipase elevation on his last visit    EKG demonstrates no evidence of acute ischemic changes or dysrhythmia.    Patient was given morphine and Zofran for pain  and nausea control and kept n.p.o. during his evaluation.     Ultrasound shows mildly echogenic liver and a cystic process in the right anterior pararenal space that is likely fluid-filled duodenum over pancreatic head cyst and the patient pancreatic enzymes are improving    Laboratory evaluation reveals no elevation of troponin, improving lipase, no ischemic changes on EKG and no electrolyte derangements, leukocytosis, shift or anemia    Patient improved after GI cocktail somewhat.  Still had some pain and I think this is esophagitis/gastritis.  I think the patient would benefit from episodic Maalox or Mylanta and likely will need endoscopy as an outpatient through his primary care doctor.  [facility] and he is instructed to continue his proton pump inhibitor therapy and avoid certain foods, fluids and medicines that would exacerbate his esophagitis and gastritis    Patient's daughter acted as  and patient understood all points of discussion and will return if any significant change in symptoms such as persistent vomiting or chest pain    FINAL IMPRESSION  1. Esophagitis    2. Acute gastritis without hemorrhage, unspecified gastritis type             Electronically signed by: Yobani Mata M.D., 4/13/2021 10:06 AM

## 2021-04-13 NOTE — ED NOTES
Discharge instructions given and discussed.  Pt educated to come back to ER for new or worsening symptoms and follow up with GI as instructed. Pt verbalized understanding. VSS. Pt  Discharged in stable condition.

## 2021-04-13 NOTE — ED TRIAGE NOTES
"Chief Complaint   Patient presents with   • Abdominal Pain     Pt reports continues to have generalized abd pain s/p previous visit   • Flank Pain     Right     /92   Pulse 81   Temp 36.7 °C (98 °F) (Temporal)   Resp 15   Ht 1.6 m (5' 3\")   Wt 83 kg (182 lb 15.7 oz)   SpO2 95%   BMI 32.41 kg/m²     Pt ambulated to ED w/ daughter for c/o continuing generalized abd pain radiating down to groin and Right flank.   Pt reports did take prescribed antibx.  Pt denies N/V, denies dysuria.    Pt has received Covid Vaccine x 2.  "

## 2021-04-13 NOTE — DISCHARGE INSTRUCTIONS
Please see the gastroenterologist in follow-up as you likely need endoscopy    Use Maalox or Mylanta for symptomatic relief every couple of hours as needed and avoid foods and beverages as well as medications that would make this worse such as alcohol, caffeine, sodas, spicy food, acidic food, medication such as Motrin Aleve aspirin

## 2021-04-13 NOTE — ED NOTES
Med rec updated and complete  Allergies reviewed  Interviewed pt with daughter at bedside with permission from pt.  Pts daughter had RX bottles at bedside of pts AUGMENTIN 875-125MG and DICYCLOMINE 20MG, went over RX bottles and returned RX bottles back to pts daughter.   Pt reports no vitamins

## 2021-10-20 ENCOUNTER — HOSPITAL ENCOUNTER (OUTPATIENT)
Dept: RADIOLOGY | Facility: MEDICAL CENTER | Age: 66
End: 2021-10-20
Payer: MEDICARE

## 2021-10-20 ENCOUNTER — HOSPITAL ENCOUNTER (OUTPATIENT)
Dept: RADIOLOGY | Facility: MEDICAL CENTER | Age: 66
End: 2021-10-20
Attending: INTERNAL MEDICINE
Payer: MEDICARE

## 2021-10-20 ENCOUNTER — OFFICE VISIT (OUTPATIENT)
Dept: SURGICAL ONCOLOGY | Facility: MEDICAL CENTER | Age: 66
End: 2021-10-20
Payer: MEDICARE

## 2021-10-20 VITALS
DIASTOLIC BLOOD PRESSURE: 70 MMHG | SYSTOLIC BLOOD PRESSURE: 162 MMHG | BODY MASS INDEX: 34.04 KG/M2 | TEMPERATURE: 98.6 F | HEART RATE: 81 BPM | OXYGEN SATURATION: 93 % | WEIGHT: 185 LBS | HEIGHT: 62 IN

## 2021-10-20 DIAGNOSIS — G89.3 PAIN DUE TO NEOPLASM: ICD-10-CM

## 2021-10-20 DIAGNOSIS — R10.2 PELVIC PAIN IN MALE: ICD-10-CM

## 2021-10-20 DIAGNOSIS — C49.5: ICD-10-CM

## 2021-10-20 DIAGNOSIS — R93.5 ABNORMAL CT OF THE ABDOMEN: ICD-10-CM

## 2021-10-20 PROCEDURE — 99205 OFFICE O/P NEW HI 60 MIN: CPT | Performed by: SURGERY

## 2021-10-20 RX ORDER — HYDROCODONE BITARTRATE AND ACETAMINOPHEN 5; 325 MG/1; MG/1
1 TABLET ORAL EVERY 6 HOURS PRN
Status: ON HOLD | COMMUNITY
Start: 2021-10-13 | End: 2021-11-08

## 2021-10-20 RX ORDER — PANTOPRAZOLE SODIUM 40 MG/1
40 TABLET, DELAYED RELEASE ORAL PRN
Status: ON HOLD | COMMUNITY
Start: 2021-08-10 | End: 2021-11-04

## 2021-10-20 ASSESSMENT — ENCOUNTER SYMPTOMS
HEADACHES: 1
WEIGHT LOSS: 1
FEVER: 1
ABDOMINAL PAIN: 1
EYE PAIN: 1
CHILLS: 1

## 2021-10-20 ASSESSMENT — FIBROSIS 4 INDEX: FIB4 SCORE: 1.26

## 2021-10-20 NOTE — PROGRESS NOTES
Subjective:   10/20/2021  6:38 AM  Primary care physician:Yan Powell D.O.  Referring Provider: Luis Starr MD   Medical Oncologist: Luis Starr MD     Chief Complaint:   Chief Complaint   Patient presents with   • New Patient     NP PERITONEAL RHABDOMYOSARCOMA REF DR STARR      Diagnosis:   1. Rhabdomyosarcoma of pelvis (HCC)     2. Pelvic pain in male     3. Abnormal CT of the abdomen     4. Pain due to neoplasm         History of presenting illness:  Kenyon Hewitt  is a pleasant 66 y.o. year old male who presented with what appears to be a mass in the right anterior abdominal wall near the pelvis.  Patient was worked up for abdominal pain as well as left lower extremity pain during the work-up they found a mass along the right flank/anterior garíca wall near the pelvic brim.  It was actually biopsied and came back as a rhabdomyosarcoma measuring approximately 5 cm in size coming off the right lateral abdominal wall.  The patient denies any weight loss.  This is all being done by a formal .  He is here with his daughter and wife in the room.  Patient did have stroke in 2014 and has some weakness in his right arm and at times slurred speech but he is not on any blood thinners and he is not being followed by any neurologist or cardiologist.  Patient denies ever having a heart attack, he does not smoke or drink alcohol, he is slightly obese.  His ECOG performance status is 0 even with the weakness in his regular speech.  He has not had any other malignancies or any family had any history of any other malignancies.      Past Medical History:   Diagnosis Date   • Backpain    • Diabetes    • Diverticulosis    • Hyperlipidemia    • Hypertension    • Type II or unspecified type diabetes mellitus without mention of complication, uncontrolled 5/18/2010   • Vitiligo      No past surgical history on file.  Allergies   Allergen Reactions   • Nkda [No Known Drug Allergy]      Outpatient Encounter  Medications as of 10/20/2021   Medication Sig Dispense Refill   • HYDROcodone-acetaminophen (NORCO) 5-325 MG Tab per tablet      • pantoprazole (PROTONIX) 40 MG Tablet Delayed Response      • Acetaminophen (TYLENOL PO) Take 2 Tablets by mouth as needed (For pain). Pt is not sure there strength     • atorvastatin (LIPITOR) 40 MG Tab Take 40 mg by mouth every evening.     • busPIRone (BUSPAR) 10 MG Tab Take 10 mg by mouth 3 times a day as needed (For anxiety).     • lisinopril (PRINIVIL) 5 MG Tab Take 5 mg by mouth every day.     • metformin (GLUCOPHAGE) 1000 MG tablet Take 1 Tab by mouth 2 times a day, with meals. 60 Tab 3   • [DISCONTINUED] amoxicillin-clavulanate (AUGMENTIN) 875-125 MG Tab Take 1 tablet by mouth 2 times a day. Pt started on 2021 for 7 day course     • dicyclomine (BENTYL) 20 MG Tab Take 1 tablet by mouth 4 times a day as needed (abdominal pain). 30 tablet 0   • dexlansoprazole (DEXILANT) 30 MG CAPSULE DELAYED RELEASE Take 1 Cap by mouth every day. Failing Prilosec and Zantac. 30 Cap 0   • sucralfate (CARAFATE) 1 GM Tab Take 1 Tab by mouth 4 Times a Day,Before Meals and at Bedtime. (Patient not taking: Reported on 2021) 120 Tab 0   • [DISCONTINUED] aspirin EC (ECOTRIN) 81 MG Tablet Delayed Response Take 81 mg by mouth every 7 days. On Monday     • [DISCONTINUED] finasteride (PROSCAR) 5 MG Tab Take 5 mg by mouth every evening.       No facility-administered encounter medications on file as of 10/20/2021.     Social History     Socioeconomic History   • Marital status: Single     Spouse name: Not on file   • Number of children: Not on file   • Years of education: Not on file   • Highest education level: Not on file   Occupational History   • Not on file   Tobacco Use   • Smoking status: Former Smoker     Years: 16.00     Types: Cigarettes     Quit date: 1990     Years since quittin.8   • Smokeless tobacco: Never Used   Substance and Sexual Activity   • Alcohol use: No   • Drug use:  No   • Sexual activity: Not on file   Other Topics Concern   • Not on file   Social History Narrative   • Not on file     Social Determinants of Health     Financial Resource Strain:    • Difficulty of Paying Living Expenses:    Food Insecurity:    • Worried About Running Out of Food in the Last Year:    • Ran Out of Food in the Last Year:    Transportation Needs:    • Lack of Transportation (Medical):    • Lack of Transportation (Non-Medical):    Physical Activity:    • Days of Exercise per Week:    • Minutes of Exercise per Session:    Stress:    • Feeling of Stress :    Social Connections:    • Frequency of Communication with Friends and Family:    • Frequency of Social Gatherings with Friends and Family:    • Attends Confucianist Services:    • Active Member of Clubs or Organizations:    • Attends Club or Organization Meetings:    • Marital Status:    Intimate Partner Violence:    • Fear of Current or Ex-Partner:    • Emotionally Abused:    • Physically Abused:    • Sexually Abused:       Social History     Tobacco Use   Smoking Status Former Smoker   • Years: 16.00   • Types: Cigarettes   • Quit date: 1990   • Years since quittin.8   Smokeless Tobacco Never Used     Social History     Substance and Sexual Activity   Alcohol Use No     Social History     Substance and Sexual Activity   Drug Use No        Family History   Problem Relation Age of Onset   • Diabetes Mother    • Hypertension Mother    • Psychiatric Illness Father    • Diabetes Sister    • Diabetes Brother        Review of Systems   Constitutional: Positive for chills, fever and weight loss.   Eyes: Positive for pain.   Gastrointestinal: Positive for abdominal pain.   Musculoskeletal:        Muscle aches    Neurological: Positive for headaches.   All other systems reviewed and are negative.       Objective:   BP (!) 162/70 (BP Location: Right arm, Patient Position: Sitting, BP Cuff Size: Adult)   Pulse 81   Temp 37 °C (98.6 °F) (Temporal)    "Ht 1.575 m (5' 2\")   Wt 83.9 kg (185 lb)   SpO2 93%   BMI 33.84 kg/m²     Physical Exam  Vitals and nursing note reviewed.   Constitutional:       Appearance: Normal appearance.   HENT:      Head: Normocephalic.      Nose: Nose normal.      Mouth/Throat:      Mouth: Mucous membranes are dry.      Pharynx: Oropharynx is clear.   Eyes:      Conjunctiva/sclera: Conjunctivae normal.      Pupils: Pupils are equal, round, and reactive to light.   Cardiovascular:      Rate and Rhythm: Normal rate and regular rhythm.      Pulses: Normal pulses.      Heart sounds: Normal heart sounds.   Pulmonary:      Effort: Pulmonary effort is normal.      Breath sounds: Normal breath sounds.   Abdominal:      General: Abdomen is flat. Bowel sounds are normal.      Palpations: Abdomen is soft.      Tenderness: There is abdominal tenderness.   Musculoskeletal:      Cervical back: Normal range of motion and neck supple.      Comments: Right-sided arm weakness secondary to stroke in 2014   Skin:     General: Skin is warm and dry.   Neurological:      Mental Status: He is alert and oriented to person, place, and time.      Comments: Right-sided hand weakness from a stroke in 2014   Psychiatric:         Mood and Affect: Mood normal.         Behavior: Behavior normal.         Thought Content: Thought content normal.         Judgment: Judgment normal.         Labs:      Imaging: 10/12/21 PET/CT [facility]    Per my read,           9/18/21 CT [facility]           Pathology: 9/18/21 [facility]         Procedures: 9/18/21        Diagnosis:     1. Rhabdomyosarcoma of pelvis (HCC)     2. Pelvic pain in male     3. Abnormal CT of the abdomen     4. Pain due to neoplasm             Medical Decision Making:  Today's Assessment / Status / Plan:     In light of the present findings, the patient would benefit from resection.  I have spoken to his medical oncologist and we are in agreement that upfront surgery would be best.  We discussed the risks and benefits of the " procedure including bleeding, infection, worsening pain in the region of the resection, the placement of stent is still clips for postoperative targeting if in need of radiation postop.  They agreed to the plan and the patient was scheduled as soon as possible.  He understands that he may be in the hospital 1 to 2 days and there is a small risk of worsening his stroke from 2014.    I, Dr. Bain have entered, reviewed and confirmed the above diagnoses related to this patient on this date of service, 10/20/2021  6:38 AM.    He agreed and verbalized his agreement and understanding with the current plan. I answered all questions and concerns he has at this time and advised him to call at any time in the interim with questions or concerns in regards to his care.    Thank you for allowing me to participate in his care, I will continue to follow closely.       Please note that this dictation was created using voice recognition software. I have made every reasonable attempt to correct obvious errors, but I expect that there are errors of grammar and possibly content that I did not discover before finalizing the note.     Thank you for this consultation and allowing me to participate in your patient's care. If I can be of further service please contact my office.

## 2021-10-27 RX ORDER — POLYETHYLENE GLYCOL 3350, SODIUM SULFATE ANHYDROUS, SODIUM BICARBONATE, SODIUM CHLORIDE, POTASSIUM CHLORIDE 236; 22.74; 6.74; 5.86; 2.97 G/4L; G/4L; G/4L; G/4L; G/4L
4 POWDER, FOR SOLUTION ORAL ONCE
Qty: 4000 ML | Refills: 0 | Status: SHIPPED | OUTPATIENT
Start: 2021-10-27 | End: 2021-10-27

## 2021-11-03 ENCOUNTER — APPOINTMENT (OUTPATIENT)
Dept: ADMISSIONS | Facility: MEDICAL CENTER | Age: 66
DRG: 517 | End: 2021-11-03
Payer: MEDICARE

## 2021-11-03 ENCOUNTER — PRE-ADMISSION TESTING (OUTPATIENT)
Dept: ADMISSIONS | Facility: MEDICAL CENTER | Age: 66
DRG: 517 | End: 2021-11-03
Attending: SURGERY
Payer: MEDICARE

## 2021-11-03 DIAGNOSIS — Z01.812 PRE-OPERATIVE LABORATORY EXAMINATION: ICD-10-CM

## 2021-11-03 DIAGNOSIS — Z01.810 PRE-OPERATIVE CARDIOVASCULAR EXAMINATION: ICD-10-CM

## 2021-11-03 LAB
ALBUMIN SERPL BCP-MCNC: 4.2 G/DL (ref 3.2–4.9)
ALBUMIN/GLOB SERPL: 1.4 G/DL
ALP SERPL-CCNC: 86 U/L (ref 30–99)
ALT SERPL-CCNC: 24 U/L (ref 2–50)
ANION GAP SERPL CALC-SCNC: 13 MMOL/L (ref 7–16)
AST SERPL-CCNC: 14 U/L (ref 12–45)
BASOPHILS # BLD AUTO: 0.7 % (ref 0–1.8)
BASOPHILS # BLD: 0.03 K/UL (ref 0–0.12)
BILIRUB SERPL-MCNC: 0.4 MG/DL (ref 0.1–1.5)
BUN SERPL-MCNC: 15 MG/DL (ref 8–22)
CALCIUM SERPL-MCNC: 9.7 MG/DL (ref 8.5–10.5)
CHLORIDE SERPL-SCNC: 104 MMOL/L (ref 96–112)
CO2 SERPL-SCNC: 25 MMOL/L (ref 20–33)
CREAT SERPL-MCNC: 0.89 MG/DL (ref 0.5–1.4)
EKG IMPRESSION: NORMAL
EOSINOPHIL # BLD AUTO: 0.24 K/UL (ref 0–0.51)
EOSINOPHIL NFR BLD: 5.3 % (ref 0–6.9)
ERYTHROCYTE [DISTWIDTH] IN BLOOD BY AUTOMATED COUNT: 41.9 FL (ref 35.9–50)
GLOBULIN SER CALC-MCNC: 3 G/DL (ref 1.9–3.5)
GLUCOSE SERPL-MCNC: 137 MG/DL (ref 65–99)
HCT VFR BLD AUTO: 44.1 % (ref 42–52)
HGB BLD-MCNC: 15 G/DL (ref 14–18)
IMM GRANULOCYTES # BLD AUTO: 0.01 K/UL (ref 0–0.11)
IMM GRANULOCYTES NFR BLD AUTO: 0.2 % (ref 0–0.9)
INR PPP: 0.94 (ref 0.87–1.13)
LYMPHOCYTES # BLD AUTO: 1.71 K/UL (ref 1–4.8)
LYMPHOCYTES NFR BLD: 37.4 % (ref 22–41)
MCH RBC QN AUTO: 30.9 PG (ref 27–33)
MCHC RBC AUTO-ENTMCNC: 34 G/DL (ref 33.7–35.3)
MCV RBC AUTO: 90.9 FL (ref 81.4–97.8)
MONOCYTES # BLD AUTO: 0.34 K/UL (ref 0–0.85)
MONOCYTES NFR BLD AUTO: 7.4 % (ref 0–13.4)
NEUTROPHILS # BLD AUTO: 2.24 K/UL (ref 1.82–7.42)
NEUTROPHILS NFR BLD: 49 % (ref 44–72)
NRBC # BLD AUTO: 0 K/UL
NRBC BLD-RTO: 0 /100 WBC
PLATELET # BLD AUTO: 201 K/UL (ref 164–446)
PMV BLD AUTO: 8.9 FL (ref 9–12.9)
POTASSIUM SERPL-SCNC: 4.1 MMOL/L (ref 3.6–5.5)
PROT SERPL-MCNC: 7.2 G/DL (ref 6–8.2)
PROTHROMBIN TIME: 12.3 SEC (ref 12–14.6)
RBC # BLD AUTO: 4.85 M/UL (ref 4.7–6.1)
SARS-COV+SARS-COV-2 AG RESP QL IA.RAPID: NOTDETECTED
SODIUM SERPL-SCNC: 142 MMOL/L (ref 135–145)
SPECIMEN SOURCE: NORMAL
WBC # BLD AUTO: 4.6 K/UL (ref 4.8–10.8)

## 2021-11-03 PROCEDURE — 93005 ELECTROCARDIOGRAM TRACING: CPT

## 2021-11-03 PROCEDURE — 93010 ELECTROCARDIOGRAM REPORT: CPT | Performed by: INTERNAL MEDICINE

## 2021-11-03 PROCEDURE — 85025 COMPLETE CBC W/AUTO DIFF WBC: CPT

## 2021-11-03 PROCEDURE — 80053 COMPREHEN METABOLIC PANEL: CPT

## 2021-11-03 PROCEDURE — 87426 SARSCOV CORONAVIRUS AG IA: CPT

## 2021-11-03 PROCEDURE — 85610 PROTHROMBIN TIME: CPT

## 2021-11-03 PROCEDURE — 36415 COLL VENOUS BLD VENIPUNCTURE: CPT

## 2021-11-03 ASSESSMENT — FIBROSIS 4 INDEX: FIB4 SCORE: 1.26

## 2021-11-04 ENCOUNTER — ANESTHESIA EVENT (OUTPATIENT)
Dept: SURGERY | Facility: MEDICAL CENTER | Age: 66
DRG: 517 | End: 2021-11-04
Payer: MEDICARE

## 2021-11-04 ENCOUNTER — HOSPITAL ENCOUNTER (INPATIENT)
Facility: MEDICAL CENTER | Age: 66
LOS: 4 days | DRG: 517 | End: 2021-11-08
Attending: SURGERY | Admitting: SURGERY
Payer: MEDICARE

## 2021-11-04 ENCOUNTER — ANESTHESIA (OUTPATIENT)
Dept: SURGERY | Facility: MEDICAL CENTER | Age: 66
DRG: 517 | End: 2021-11-04
Payer: MEDICARE

## 2021-11-04 DIAGNOSIS — G89.3 PAIN DUE TO NEOPLASM: ICD-10-CM

## 2021-11-04 DIAGNOSIS — C49.5: ICD-10-CM

## 2021-11-04 LAB
EST. AVERAGE GLUCOSE BLD GHB EST-MCNC: 163 MG/DL
GLUCOSE BLD-MCNC: 145 MG/DL (ref 65–99)
GLUCOSE BLD-MCNC: 156 MG/DL (ref 65–99)
GLUCOSE BLD-MCNC: 178 MG/DL (ref 65–99)
HBA1C MFR BLD: 7.3 % (ref 4–5.6)
PATHOLOGY CONSULT NOTE: NORMAL

## 2021-11-04 PROCEDURE — 88331 PATH CONSLTJ SURG 1 BLK 1SPC: CPT

## 2021-11-04 PROCEDURE — 501838 HCHG SUTURE GENERAL: Performed by: SURGERY

## 2021-11-04 PROCEDURE — 500380 HCHG DRAIN, PENROSE 1/4X12: Performed by: SURGERY

## 2021-11-04 PROCEDURE — 160031 HCHG SURGERY MINUTES - 1ST 30 MINS LEVEL 5: Performed by: SURGERY

## 2021-11-04 PROCEDURE — 88305 TISSUE EXAM BY PATHOLOGIST: CPT

## 2021-11-04 PROCEDURE — 160042 HCHG SURGERY MINUTES - EA ADDL 1 MIN LEVEL 5: Performed by: SURGERY

## 2021-11-04 PROCEDURE — 700111 HCHG RX REV CODE 636 W/ 250 OVERRIDE (IP): Performed by: SURGERY

## 2021-11-04 PROCEDURE — 83036 HEMOGLOBIN GLYCOSYLATED A1C: CPT

## 2021-11-04 PROCEDURE — 82962 GLUCOSE BLOOD TEST: CPT | Mod: 91

## 2021-11-04 PROCEDURE — 160002 HCHG RECOVERY MINUTES (STAT): Performed by: SURGERY

## 2021-11-04 PROCEDURE — 160048 HCHG OR STATISTICAL LEVEL 1-5: Performed by: SURGERY

## 2021-11-04 PROCEDURE — 700105 HCHG RX REV CODE 258: Performed by: SURGERY

## 2021-11-04 PROCEDURE — 700111 HCHG RX REV CODE 636 W/ 250 OVERRIDE (IP): Performed by: ANESTHESIOLOGY

## 2021-11-04 PROCEDURE — 88342 IMHCHEM/IMCYTCHM 1ST ANTB: CPT

## 2021-11-04 PROCEDURE — 0WBH0ZZ EXCISION OF RETROPERITONEUM, OPEN APPROACH: ICD-10-PCS | Performed by: SURGERY

## 2021-11-04 PROCEDURE — 700101 HCHG RX REV CODE 250: Performed by: ANESTHESIOLOGY

## 2021-11-04 PROCEDURE — 88369 M/PHMTRC ALYSISHQUANT/SEMIQ: CPT

## 2021-11-04 PROCEDURE — 88307 TISSUE EXAM BY PATHOLOGIST: CPT | Mod: 59

## 2021-11-04 PROCEDURE — 700102 HCHG RX REV CODE 250 W/ 637 OVERRIDE(OP): Performed by: SURGERY

## 2021-11-04 PROCEDURE — 76998 US GUIDE INTRAOP: CPT | Mod: 26,80 | Performed by: SURGERY

## 2021-11-04 PROCEDURE — 49204 PR EXCISION/DESTRUCTION OPEN ABDOMINAL TUMORS 5*: CPT | Mod: 59 | Performed by: SURGERY

## 2021-11-04 PROCEDURE — 700102 HCHG RX REV CODE 250 W/ 637 OVERRIDE(OP): Performed by: ANESTHESIOLOGY

## 2021-11-04 PROCEDURE — 88368 INSITU HYBRIDIZATION MANUAL: CPT

## 2021-11-04 PROCEDURE — 76998 US GUIDE INTRAOP: CPT | Mod: 26 | Performed by: SURGERY

## 2021-11-04 PROCEDURE — 160036 HCHG PACU - EA ADDL 30 MINS PHASE I: Performed by: SURGERY

## 2021-11-04 PROCEDURE — 88341 IMHCHEM/IMCYTCHM EA ADD ANTB: CPT

## 2021-11-04 PROCEDURE — 0DBU0ZZ EXCISION OF OMENTUM, OPEN APPROACH: ICD-10-PCS | Performed by: SURGERY

## 2021-11-04 PROCEDURE — 160009 HCHG ANES TIME/MIN: Performed by: SURGERY

## 2021-11-04 PROCEDURE — 3E0R3BZ INTRODUCTION OF ANESTHETIC AGENT INTO SPINAL CANAL, PERCUTANEOUS APPROACH: ICD-10-PCS | Performed by: SURGERY

## 2021-11-04 PROCEDURE — 700101 HCHG RX REV CODE 250: Performed by: SURGERY

## 2021-11-04 PROCEDURE — 160035 HCHG PACU - 1ST 60 MINS PHASE I: Performed by: SURGERY

## 2021-11-04 PROCEDURE — 49204 PR EXCISION/DESTRUCTION OPEN ABDOMINAL TUMORS 5*: CPT | Mod: 80 | Performed by: SURGERY

## 2021-11-04 PROCEDURE — 700105 HCHG RX REV CODE 258: Performed by: ANESTHESIOLOGY

## 2021-11-04 PROCEDURE — A9270 NON-COVERED ITEM OR SERVICE: HCPCS | Performed by: ANESTHESIOLOGY

## 2021-11-04 PROCEDURE — 770001 HCHG ROOM/CARE - MED/SURG/GYN PRIV*

## 2021-11-04 RX ORDER — OXYCODONE HCL 5 MG/5 ML
10 SOLUTION, ORAL ORAL
Status: COMPLETED | OUTPATIENT
Start: 2021-11-04 | End: 2021-11-04

## 2021-11-04 RX ORDER — LIDOCAINE HYDROCHLORIDE AND EPINEPHRINE 15; 5 MG/ML; UG/ML
INJECTION, SOLUTION EPIDURAL
Status: COMPLETED | OUTPATIENT
Start: 2021-11-04 | End: 2021-11-04

## 2021-11-04 RX ORDER — HALOPERIDOL 5 MG/ML
1 INJECTION INTRAMUSCULAR
Status: DISCONTINUED | OUTPATIENT
Start: 2021-11-04 | End: 2021-11-04 | Stop reason: HOSPADM

## 2021-11-04 RX ORDER — HALOPERIDOL 5 MG/ML
1 INJECTION INTRAMUSCULAR EVERY 6 HOURS PRN
Status: DISCONTINUED | OUTPATIENT
Start: 2021-11-04 | End: 2021-11-05

## 2021-11-04 RX ORDER — ROCURONIUM BROMIDE 10 MG/ML
INJECTION, SOLUTION INTRAVENOUS PRN
Status: DISCONTINUED | OUTPATIENT
Start: 2021-11-04 | End: 2021-11-04 | Stop reason: SURG

## 2021-11-04 RX ORDER — POLYETHYLENE GLYCOL-3350 AND ELECTROLYTES 236; 6.74; 5.86; 2.97; 22.74 G/274.31G; G/274.31G; G/274.31G; G/274.31G; G/274.31G
4 POWDER, FOR SOLUTION ORAL ONCE
COMMUNITY
Start: 2021-10-29

## 2021-11-04 RX ORDER — SCOLOPAMINE TRANSDERMAL SYSTEM 1 MG/1
1 PATCH, EXTENDED RELEASE TRANSDERMAL
Status: DISCONTINUED | OUTPATIENT
Start: 2021-11-04 | End: 2021-11-05

## 2021-11-04 RX ORDER — DIPHENHYDRAMINE HYDROCHLORIDE 50 MG/ML
12.5 INJECTION INTRAMUSCULAR; INTRAVENOUS
Status: DISCONTINUED | OUTPATIENT
Start: 2021-11-04 | End: 2021-11-04 | Stop reason: HOSPADM

## 2021-11-04 RX ORDER — DEXTROSE MONOHYDRATE 25 G/50ML
50 INJECTION, SOLUTION INTRAVENOUS
Status: DISCONTINUED | OUTPATIENT
Start: 2021-11-04 | End: 2021-11-08 | Stop reason: HOSPADM

## 2021-11-04 RX ORDER — ONDANSETRON 2 MG/ML
INJECTION INTRAMUSCULAR; INTRAVENOUS PRN
Status: DISCONTINUED | OUTPATIENT
Start: 2021-11-04 | End: 2021-11-04 | Stop reason: SURG

## 2021-11-04 RX ORDER — LIDOCAINE HYDROCHLORIDE 20 MG/ML
INJECTION, SOLUTION EPIDURAL; INFILTRATION; INTRACAUDAL; PERINEURAL PRN
Status: DISCONTINUED | OUTPATIENT
Start: 2021-11-04 | End: 2021-11-04 | Stop reason: SURG

## 2021-11-04 RX ORDER — MEPERIDINE HYDROCHLORIDE 25 MG/ML
6.25 INJECTION INTRAMUSCULAR; INTRAVENOUS; SUBCUTANEOUS
Status: DISCONTINUED | OUTPATIENT
Start: 2021-11-04 | End: 2021-11-04 | Stop reason: HOSPADM

## 2021-11-04 RX ORDER — OXYCODONE HCL 5 MG/5 ML
5 SOLUTION, ORAL ORAL
Status: COMPLETED | OUTPATIENT
Start: 2021-11-04 | End: 2021-11-04

## 2021-11-04 RX ORDER — HYDROMORPHONE HYDROCHLORIDE 1 MG/ML
0.2 INJECTION, SOLUTION INTRAMUSCULAR; INTRAVENOUS; SUBCUTANEOUS
Status: DISCONTINUED | OUTPATIENT
Start: 2021-11-04 | End: 2021-11-04

## 2021-11-04 RX ORDER — EMPAGLIFLOZIN AND METFORMIN HYDROCHLORIDE 12.5; 1 MG/1; MG/1
1 TABLET ORAL 2 TIMES DAILY
COMMUNITY
Start: 2021-08-30

## 2021-11-04 RX ORDER — ONDANSETRON 2 MG/ML
4 INJECTION INTRAMUSCULAR; INTRAVENOUS EVERY 4 HOURS PRN
Status: DISCONTINUED | OUTPATIENT
Start: 2021-11-04 | End: 2021-11-08 | Stop reason: HOSPADM

## 2021-11-04 RX ORDER — ONDANSETRON 2 MG/ML
4 INJECTION INTRAMUSCULAR; INTRAVENOUS
Status: DISCONTINUED | OUTPATIENT
Start: 2021-11-04 | End: 2021-11-04 | Stop reason: HOSPADM

## 2021-11-04 RX ORDER — SODIUM CHLORIDE, SODIUM LACTATE, POTASSIUM CHLORIDE, CALCIUM CHLORIDE 600; 310; 30; 20 MG/100ML; MG/100ML; MG/100ML; MG/100ML
INJECTION, SOLUTION INTRAVENOUS CONTINUOUS
Status: ACTIVE | OUTPATIENT
Start: 2021-11-04 | End: 2021-11-04

## 2021-11-04 RX ORDER — HYDROMORPHONE HYDROCHLORIDE 1 MG/ML
0.1 INJECTION, SOLUTION INTRAMUSCULAR; INTRAVENOUS; SUBCUTANEOUS
Status: DISCONTINUED | OUTPATIENT
Start: 2021-11-04 | End: 2021-11-04

## 2021-11-04 RX ORDER — HYDROMORPHONE HYDROCHLORIDE 1 MG/ML
0.4 INJECTION, SOLUTION INTRAMUSCULAR; INTRAVENOUS; SUBCUTANEOUS
Status: DISCONTINUED | OUTPATIENT
Start: 2021-11-04 | End: 2021-11-04

## 2021-11-04 RX ORDER — SODIUM CHLORIDE, SODIUM LACTATE, POTASSIUM CHLORIDE, CALCIUM CHLORIDE 600; 310; 30; 20 MG/100ML; MG/100ML; MG/100ML; MG/100ML
INJECTION, SOLUTION INTRAVENOUS CONTINUOUS
Status: DISCONTINUED | OUTPATIENT
Start: 2021-11-04 | End: 2021-11-05

## 2021-11-04 RX ORDER — CEFAZOLIN SODIUM 1 G/3ML
INJECTION, POWDER, FOR SOLUTION INTRAMUSCULAR; INTRAVENOUS PRN
Status: DISCONTINUED | OUTPATIENT
Start: 2021-11-04 | End: 2021-11-04 | Stop reason: SURG

## 2021-11-04 RX ORDER — DIPHENHYDRAMINE HYDROCHLORIDE 50 MG/ML
25 INJECTION INTRAMUSCULAR; INTRAVENOUS EVERY 6 HOURS PRN
Status: DISCONTINUED | OUTPATIENT
Start: 2021-11-04 | End: 2021-11-05

## 2021-11-04 RX ORDER — DEXAMETHASONE SODIUM PHOSPHATE 4 MG/ML
4 INJECTION, SOLUTION INTRA-ARTICULAR; INTRALESIONAL; INTRAMUSCULAR; INTRAVENOUS; SOFT TISSUE
Status: DISCONTINUED | OUTPATIENT
Start: 2021-11-04 | End: 2021-11-05

## 2021-11-04 RX ADMIN — HYDROMORPHONE HYDROCHLORIDE 0.4 MG: 1 INJECTION, SOLUTION INTRAMUSCULAR; INTRAVENOUS; SUBCUTANEOUS at 12:56

## 2021-11-04 RX ADMIN — SODIUM CHLORIDE, POTASSIUM CHLORIDE, SODIUM LACTATE AND CALCIUM CHLORIDE: 600; 310; 30; 20 INJECTION, SOLUTION INTRAVENOUS at 18:32

## 2021-11-04 RX ADMIN — ONDANSETRON 4 MG: 2 INJECTION INTRAMUSCULAR; INTRAVENOUS at 10:26

## 2021-11-04 RX ADMIN — PROPOFOL 150 MG: 10 INJECTION, EMULSION INTRAVENOUS at 09:03

## 2021-11-04 RX ADMIN — HYDROMORPHONE HYDROCHLORIDE: 1 INJECTION, SOLUTION INTRAMUSCULAR; INTRAVENOUS; SUBCUTANEOUS at 11:24

## 2021-11-04 RX ADMIN — LIDOCAINE HYDROCHLORIDE 20 MG: 20 INJECTION, SOLUTION EPIDURAL; INFILTRATION; INTRACAUDAL at 09:03

## 2021-11-04 RX ADMIN — HYDROMORPHONE HYDROCHLORIDE 0.4 MG: 1 INJECTION, SOLUTION INTRAMUSCULAR; INTRAVENOUS; SUBCUTANEOUS at 13:09

## 2021-11-04 RX ADMIN — OXYCODONE HYDROCHLORIDE 10 MG: 5 SOLUTION ORAL at 12:20

## 2021-11-04 RX ADMIN — FENTANYL CITRATE 50 MCG: 50 INJECTION, SOLUTION INTRAMUSCULAR; INTRAVENOUS at 09:03

## 2021-11-04 RX ADMIN — LIDOCAINE HYDROCHLORIDE,EPINEPHRINE BITARTRATE 3 ML: 15; .005 INJECTION, SOLUTION EPIDURAL; INFILTRATION; INTRACAUDAL; PERINEURAL at 08:45

## 2021-11-04 RX ADMIN — Medication: at 18:19

## 2021-11-04 RX ADMIN — HYDROMORPHONE HYDROCHLORIDE 0.2 MG: 1 INJECTION, SOLUTION INTRAMUSCULAR; INTRAVENOUS; SUBCUTANEOUS at 12:23

## 2021-11-04 RX ADMIN — CEFTRIAXONE SODIUM 2 G: 2 INJECTION, POWDER, FOR SOLUTION INTRAMUSCULAR; INTRAVENOUS at 17:32

## 2021-11-04 RX ADMIN — HYDROMORPHONE HYDROCHLORIDE 0.4 MG: 1 INJECTION, SOLUTION INTRAMUSCULAR; INTRAVENOUS; SUBCUTANEOUS at 11:03

## 2021-11-04 RX ADMIN — HYDROMORPHONE HYDROCHLORIDE 0.4 MG: 1 INJECTION, SOLUTION INTRAMUSCULAR; INTRAVENOUS; SUBCUTANEOUS at 12:18

## 2021-11-04 RX ADMIN — ROCURONIUM BROMIDE 20 MG: 10 INJECTION, SOLUTION INTRAVENOUS at 10:03

## 2021-11-04 RX ADMIN — CEFAZOLIN 2 G: 330 INJECTION, POWDER, FOR SOLUTION INTRAMUSCULAR; INTRAVENOUS at 09:16

## 2021-11-04 RX ADMIN — FENTANYL CITRATE 100 MCG: 50 INJECTION, SOLUTION INTRAMUSCULAR; INTRAVENOUS at 09:33

## 2021-11-04 RX ADMIN — FENTANYL CITRATE 50 MCG: 50 INJECTION, SOLUTION INTRAMUSCULAR; INTRAVENOUS at 10:30

## 2021-11-04 RX ADMIN — SODIUM CHLORIDE, POTASSIUM CHLORIDE, SODIUM LACTATE AND CALCIUM CHLORIDE: 600; 310; 30; 20 INJECTION, SOLUTION INTRAVENOUS at 07:30

## 2021-11-04 RX ADMIN — HYDROMORPHONE HYDROCHLORIDE 0.2 MG: 1 INJECTION, SOLUTION INTRAMUSCULAR; INTRAVENOUS; SUBCUTANEOUS at 13:14

## 2021-11-04 RX ADMIN — MIDAZOLAM 2 MG: 1 INJECTION INTRAMUSCULAR; INTRAVENOUS at 09:03

## 2021-11-04 RX ADMIN — ROCURONIUM BROMIDE 20 MG: 10 INJECTION, SOLUTION INTRAVENOUS at 09:35

## 2021-11-04 RX ADMIN — SUGAMMADEX 200 MG: 100 INJECTION, SOLUTION INTRAVENOUS at 10:36

## 2021-11-04 RX ADMIN — FENTANYL CITRATE 50 MCG: 50 INJECTION, SOLUTION INTRAMUSCULAR; INTRAVENOUS at 11:25

## 2021-11-04 RX ADMIN — FENTANYL CITRATE 50 MCG: 50 INJECTION, SOLUTION INTRAMUSCULAR; INTRAVENOUS at 10:56

## 2021-11-04 ASSESSMENT — COGNITIVE AND FUNCTIONAL STATUS - GENERAL
SUGGESTED CMS G CODE MODIFIER DAILY ACTIVITY: CJ
DRESSING REGULAR UPPER BODY CLOTHING: A LITTLE
MOVING FROM LYING ON BACK TO SITTING ON SIDE OF FLAT BED: A LOT
WALKING IN HOSPITAL ROOM: A LITTLE
HELP NEEDED FOR BATHING: A LITTLE
SUGGESTED CMS G CODE MODIFIER MOBILITY: CK
STANDING UP FROM CHAIR USING ARMS: A LITTLE
CLIMB 3 TO 5 STEPS WITH RAILING: A LITTLE
MOVING TO AND FROM BED TO CHAIR: A LOT
DAILY ACTIVITIY SCORE: 20
TOILETING: A LITTLE
MOBILITY SCORE: 15
TURNING FROM BACK TO SIDE WHILE IN FLAT BAD: A LOT
DRESSING REGULAR LOWER BODY CLOTHING: A LITTLE

## 2021-11-04 ASSESSMENT — LIFESTYLE VARIABLES
HAVE YOU EVER FELT YOU SHOULD CUT DOWN ON YOUR DRINKING: NO
CONSUMPTION TOTAL: NEGATIVE
TOTAL SCORE: 0
ON A TYPICAL DAY WHEN YOU DRINK ALCOHOL HOW MANY DRINKS DO YOU HAVE: 0
EVER HAD A DRINK FIRST THING IN THE MORNING TO STEADY YOUR NERVES TO GET RID OF A HANGOVER: NO
EVER FELT BAD OR GUILTY ABOUT YOUR DRINKING: NO
TOTAL SCORE: 0
TOTAL SCORE: 0
HOW MANY TIMES IN THE PAST YEAR HAVE YOU HAD 5 OR MORE DRINKS IN A DAY: 0
AVERAGE NUMBER OF DAYS PER WEEK YOU HAVE A DRINK CONTAINING ALCOHOL: 0
ALCOHOL_USE: NO
HAVE PEOPLE ANNOYED YOU BY CRITICIZING YOUR DRINKING: NO

## 2021-11-04 ASSESSMENT — PAIN DESCRIPTION - PAIN TYPE
TYPE: SURGICAL PAIN

## 2021-11-04 ASSESSMENT — PATIENT HEALTH QUESTIONNAIRE - PHQ9
1. LITTLE INTEREST OR PLEASURE IN DOING THINGS: NOT AT ALL
2. FEELING DOWN, DEPRESSED, IRRITABLE, OR HOPELESS: NOT AT ALL
SUM OF ALL RESPONSES TO PHQ9 QUESTIONS 1 AND 2: 0

## 2021-11-04 ASSESSMENT — FIBROSIS 4 INDEX: FIB4 SCORE: 0.97

## 2021-11-04 ASSESSMENT — PAIN SCALES - GENERAL: PAIN_LEVEL: 5

## 2021-11-04 NOTE — ANESTHESIA PREPROCEDURE EVALUATION
Relevant Problems   ANESTHESIA   (positive) BERNADINE (obstructive sleep apnea)      NEURO   (positive) Headache   (positive) Stroke (HCC)      CARDIAC   (positive) Hypertension      ENDO   (positive) Type II or unspecified type diabetes mellitus without mention of complication, uncontrolled       Physical Exam    Airway   Mallampati: II  TM distance: >3 FB  Neck ROM: full       Cardiovascular - normal exam  Rhythm: regular  Rate: normal  (-) murmur  Comments: EKG NSR   Dental - normal exam  (+) upper dentures, lower dentures           Pulmonary - normal exam  Breath sounds clear to auscultation  Comments: Sleep apnea not using cpap   Abdominal    Neurological - normal exam    Comments: R sided upper ext weakness/parkinsons             Anesthesia Plan    ASA 3       Plan - general and epidural   Neuraxial block will be post-op pain control    Airway plan will be ETT        Plan Factors:   Patient was previously instructed to abstain from smoking on day of procedure.  Patient did not smoke on day of procedure.      Induction: intravenous    Postoperative Plan: Postoperative administration of opioids is intended.    Pertinent diagnostic labs and testing reviewed    Informed Consent:    Anesthetic plan and risks discussed with patient.    Use of blood products discussed with: patient whom consented to blood products.

## 2021-11-04 NOTE — ANESTHESIA POSTPROCEDURE EVALUATION
Patient: Kenyon Hewitt    Procedure Summary     Date: 11/04/21 Room / Location: [place] [address] / SURGERY [facility]    Anesthesia Start: 0843 Anesthesia Stop: 1110    Procedures:       EXCISION, RETROPERITONEUM - TUMOR (N/A Abdomen)      LAPAROTOMY, EXPLORATORY (Abdomen)      INTRA OPERATIVE ULTRASOUND GUIDANCE (Abdomen)      OMENTECTOMY AND OMENTAL BIOPSY (Abdomen) Diagnosis: (RETROPERITONEAL TUMOR)    Surgeons: Tyrone Bain M.D. Responsible Provider: Lizbet Cifuentes M.D.    Anesthesia Type: general, epidural ASA Status: 3          Final Anesthesia Type: general, epidural  Last vitals  BP   Blood Pressure : 126/64    Temp   36.7 °C (98 °F)    Pulse   64   Resp   13    SpO2   97 %      Anesthesia Post Evaluation    Patient location during evaluation: PACU  Patient participation: complete - patient participated  Level of consciousness: awake and alert  Pain score: 5    Airway patency: patent  Anesthetic complications: no  Cardiovascular status: hemodynamically stable  Respiratory status: acceptable  Hydration status: euvolemic  Comments: Pt epidural is spotty, difficult with language barrier DCD epidual tip intact will give dilaudid PCA VSS    PONV: none          No complications documented.     Nurse Pain Score: 6 (NPRS)

## 2021-11-04 NOTE — PROGRESS NOTES
Med rec complete per pt's family to interpret  at bedside  Interviewed pt with family at bedside with permission from pt  Allergies reviewed and updated.

## 2021-11-04 NOTE — ANESTHESIA TIME REPORT
Anesthesia Start and Stop Event Times     Date Time Event    11/4/2021 08:43 AM Anesthesia Start    11/4/2021 11:10 AM Anesthesia Stop        Responsible Staff  11/04/21    Name Role Begin End    Lizbet Cifuentes M.D. Anesthesiologist 11/04/21 08:43 AM 11/04/21 11:10 AM        Preop Diagnosis (Free Text):  Pre-op Diagnosis     RETROPERITONEAL TUMOR        Preop Diagnosis (Codes):    Premium Reason  Non-Premium    Comments:

## 2021-11-04 NOTE — OR NURSING
1053- Pt arrives to PACU from OR on 6L of oxygen via mask, OPA removed upon arrival. Report received. Dressing to abd CDI. Pt states 10/10 pain upon arrival, epidural initiated.     1153- Pt states pain much better with administration of epidural medication, daughter Griselda called and updated on pt status and POC.     1318- Pt states pain increasing again, medicated per MAR, anesthesia notified. MD to come assess pt and epidural.     1500- Report called to Pilar RAMSAY. Signed and held orders discussed.     1617- Dr. Cifuentes at bedside to assess pt. Epidural catheter removed, tip intact. MD to order PCA pump for floor.     1649- Pt taken to room by CNA on 10L of oxygen. Dressing CDI, pt comfortable upon leaving PACU.

## 2021-11-04 NOTE — OR NURSING
1331 Report from Ninfa RAMSAY for lunch and care of pt assumed at this time. Patient sleeping comfortably.     1359 handoff to Ninfa RAMSAY.

## 2021-11-04 NOTE — ANESTHESIA PROCEDURE NOTES
Airway    Date/Time: 11/4/2021 9:07 AM  Performed by: Lizbet Cifuentes M.D.  Authorized by: Lizbet Cifuentes M.D.     Location:  OR  Urgency:  Elective  Indications for Airway Management:  Anesthesia      Spontaneous Ventilation: absent    Sedation Level:  Deep  Preoxygenated: Yes    Patient Position:  Sniffing  Mask Difficulty Assessment:  1 - vent by mask  Final Airway Type:  Endotracheal airway  Final Endotracheal Airway:  ETT  Cuffed: Yes    Technique Used for Successful ETT Placement:  Direct laryngoscopy    Insertion Site:  Oral  Blade Type:  Kaz  Laryngoscope Blade/Videolaryngoscope Blade Size:  3  ETT Size (mm):  8.0  Measured from:  Teeth  ETT to Teeth (cm):  22  Placement Verified by: auscultation and capnometry    Cormack-Lehane Classification:  Grade IIa - partial view of glottis  Number of Attempts at Approach:  1  Number of Other Approaches Attempted:  0

## 2021-11-04 NOTE — ANESTHESIA PROCEDURE NOTES
Epidural Block    Date/Time: 11/4/2021 8:45 AM  Performed by: Lizbet Cifuentes M.D.  Authorized by: Lizbet Cifuentes M.D.     Patient Location:  OR  Start Time:  11/4/2021 8:45 AM  End Time:  11/4/2021 9:05 AM  Reason for Block: at surgeon's request and post-op pain management    patient identified, IV checked, site marked, risks and benefits discussed, surgical consent, monitors and equipment checked, pre-op evaluation and timeout performed    Patient Position:  Sitting  Prep: ChloraPrep, patient draped and sterile technique    Monitoring:  Blood pressure, continuous pulse oximetry and heart rate  Location:  T10-T11  Injection Technique:  VAZQUEZ saline  Strength:  1%  Dose:  2ml  Needle Type:  Tuohy  Needle Gauge:  17 G  Needle Length:  3.5 in  Loss of resistance::  8  Catheter Size:  19 G  Catheter at Skin Depth:  14  Test Dose Result:  Negative

## 2021-11-05 LAB
ALBUMIN SERPL BCP-MCNC: 3.7 G/DL (ref 3.2–4.9)
ALBUMIN/GLOB SERPL: 1.4 G/DL
ALP SERPL-CCNC: 69 U/L (ref 30–99)
ALT SERPL-CCNC: 23 U/L (ref 2–50)
ANION GAP SERPL CALC-SCNC: 9 MMOL/L (ref 7–16)
AST SERPL-CCNC: 19 U/L (ref 12–45)
BILIRUB SERPL-MCNC: 0.6 MG/DL (ref 0.1–1.5)
BUN SERPL-MCNC: 12 MG/DL (ref 8–22)
CALCIUM SERPL-MCNC: 9.1 MG/DL (ref 8.5–10.5)
CHLORIDE SERPL-SCNC: 104 MMOL/L (ref 96–112)
CO2 SERPL-SCNC: 26 MMOL/L (ref 20–33)
CREAT SERPL-MCNC: 0.91 MG/DL (ref 0.5–1.4)
ERYTHROCYTE [DISTWIDTH] IN BLOOD BY AUTOMATED COUNT: 42.4 FL (ref 35.9–50)
GLOBULIN SER CALC-MCNC: 2.6 G/DL (ref 1.9–3.5)
GLUCOSE BLD-MCNC: 135 MG/DL (ref 65–99)
GLUCOSE BLD-MCNC: 143 MG/DL (ref 65–99)
GLUCOSE BLD-MCNC: 146 MG/DL (ref 65–99)
GLUCOSE BLD-MCNC: 175 MG/DL (ref 65–99)
GLUCOSE SERPL-MCNC: 150 MG/DL (ref 65–99)
HCT VFR BLD AUTO: 42.9 % (ref 42–52)
HGB BLD-MCNC: 14.6 G/DL (ref 14–18)
MCH RBC QN AUTO: 30.9 PG (ref 27–33)
MCHC RBC AUTO-ENTMCNC: 34 G/DL (ref 33.7–35.3)
MCV RBC AUTO: 90.7 FL (ref 81.4–97.8)
PLATELET # BLD AUTO: 180 K/UL (ref 164–446)
PMV BLD AUTO: 8.5 FL (ref 9–12.9)
POTASSIUM SERPL-SCNC: 4 MMOL/L (ref 3.6–5.5)
PROT SERPL-MCNC: 6.3 G/DL (ref 6–8.2)
RBC # BLD AUTO: 4.73 M/UL (ref 4.7–6.1)
SODIUM SERPL-SCNC: 139 MMOL/L (ref 135–145)
WBC # BLD AUTO: 6.5 K/UL (ref 4.8–10.8)

## 2021-11-05 PROCEDURE — 97165 OT EVAL LOW COMPLEX 30 MIN: CPT

## 2021-11-05 PROCEDURE — 94760 N-INVAS EAR/PLS OXIMETRY 1: CPT

## 2021-11-05 PROCEDURE — 80053 COMPREHEN METABOLIC PANEL: CPT

## 2021-11-05 PROCEDURE — 770001 HCHG ROOM/CARE - MED/SURG/GYN PRIV*

## 2021-11-05 PROCEDURE — 85027 COMPLETE CBC AUTOMATED: CPT

## 2021-11-05 PROCEDURE — 97162 PT EVAL MOD COMPLEX 30 MIN: CPT

## 2021-11-05 PROCEDURE — 36415 COLL VENOUS BLD VENIPUNCTURE: CPT

## 2021-11-05 PROCEDURE — 82962 GLUCOSE BLOOD TEST: CPT | Mod: 91

## 2021-11-05 PROCEDURE — A9270 NON-COVERED ITEM OR SERVICE: HCPCS | Performed by: SURGERY

## 2021-11-05 PROCEDURE — 99024 POSTOP FOLLOW-UP VISIT: CPT | Performed by: SURGERY

## 2021-11-05 PROCEDURE — 700111 HCHG RX REV CODE 636 W/ 250 OVERRIDE (IP): Performed by: SURGERY

## 2021-11-05 PROCEDURE — 700102 HCHG RX REV CODE 250 W/ 637 OVERRIDE(OP): Performed by: SURGERY

## 2021-11-05 RX ORDER — HYDROMORPHONE HYDROCHLORIDE 1 MG/ML
1-2 INJECTION, SOLUTION INTRAMUSCULAR; INTRAVENOUS; SUBCUTANEOUS
Status: DISCONTINUED | OUTPATIENT
Start: 2021-11-05 | End: 2021-11-05

## 2021-11-05 RX ORDER — ACETAMINOPHEN 500 MG
1000 TABLET ORAL EVERY 8 HOURS
Status: DISCONTINUED | OUTPATIENT
Start: 2021-11-05 | End: 2021-11-08 | Stop reason: HOSPADM

## 2021-11-05 RX ORDER — ALPRAZOLAM 0.25 MG/1
0.25 TABLET ORAL NIGHTLY PRN
Status: DISCONTINUED | OUTPATIENT
Start: 2021-11-05 | End: 2021-11-08 | Stop reason: HOSPADM

## 2021-11-05 RX ORDER — POLYETHYLENE GLYCOL 3350 17 G/17G
1 POWDER, FOR SOLUTION ORAL DAILY
Status: DISCONTINUED | OUTPATIENT
Start: 2021-11-05 | End: 2021-11-08 | Stop reason: HOSPADM

## 2021-11-05 RX ORDER — DOCUSATE SODIUM 100 MG/1
100 CAPSULE, LIQUID FILLED ORAL 2 TIMES DAILY
Status: DISCONTINUED | OUTPATIENT
Start: 2021-11-05 | End: 2021-11-08 | Stop reason: HOSPADM

## 2021-11-05 RX ORDER — TRAMADOL HYDROCHLORIDE 50 MG/1
50-100 TABLET ORAL EVERY 6 HOURS PRN
Status: DISCONTINUED | OUTPATIENT
Start: 2021-11-05 | End: 2021-11-05

## 2021-11-05 RX ORDER — CELECOXIB 200 MG/1
200 CAPSULE ORAL DAILY
Status: DISCONTINUED | OUTPATIENT
Start: 2021-11-05 | End: 2021-11-08 | Stop reason: HOSPADM

## 2021-11-05 RX ORDER — TRAMADOL HYDROCHLORIDE 50 MG/1
50-100 TABLET ORAL EVERY 6 HOURS PRN
Status: DISCONTINUED | OUTPATIENT
Start: 2021-11-05 | End: 2021-11-08 | Stop reason: HOSPADM

## 2021-11-05 RX ADMIN — DOCUSATE SODIUM 100 MG: 100 CAPSULE ORAL at 21:56

## 2021-11-05 RX ADMIN — ENOXAPARIN SODIUM 40 MG: 40 INJECTION SUBCUTANEOUS at 09:45

## 2021-11-05 RX ADMIN — TRAMADOL HYDROCHLORIDE 100 MG: 50 TABLET ORAL at 09:43

## 2021-11-05 RX ADMIN — POLYETHYLENE GLYCOL 3350 1 PACKET: 17 POWDER, FOR SOLUTION ORAL at 11:47

## 2021-11-05 RX ADMIN — CELECOXIB 200 MG: 200 CAPSULE ORAL at 09:43

## 2021-11-05 RX ADMIN — HYDROMORPHONE HYDROCHLORIDE 1 MG: 1 INJECTION, SOLUTION INTRAMUSCULAR; INTRAVENOUS; SUBCUTANEOUS at 14:16

## 2021-11-05 RX ADMIN — INSULIN HUMAN 2 UNITS: 100 INJECTION, SOLUTION PARENTERAL at 06:31

## 2021-11-05 RX ADMIN — Medication: at 14:24

## 2021-11-05 RX ADMIN — ACETAMINOPHEN 1000 MG: 500 TABLET ORAL at 17:38

## 2021-11-05 RX ADMIN — DOCUSATE SODIUM 100 MG: 100 CAPSULE ORAL at 11:47

## 2021-11-05 ASSESSMENT — COPD QUESTIONNAIRES
DURING THE PAST 4 WEEKS HOW MUCH DID YOU FEEL SHORT OF BREATH: NONE/LITTLE OF THE TIME
COPD SCREENING SCORE: 2
HAVE YOU SMOKED AT LEAST 100 CIGARETTES IN YOUR ENTIRE LIFE: NO/DON'T KNOW
DO YOU EVER COUGH UP ANY MUCUS OR PHLEGM?: NO/ONLY WITH OCCASIONAL COLDS OR INFECTIONS

## 2021-11-05 ASSESSMENT — PAIN DESCRIPTION - PAIN TYPE
TYPE: SURGICAL PAIN

## 2021-11-05 ASSESSMENT — GAIT ASSESSMENTS
GAIT LEVEL OF ASSIST: SUPERVISED
DISTANCE (FEET): 250
ASSISTIVE DEVICE: FRONT WHEEL WALKER
DEVIATION: DECREASED BASE OF SUPPORT
DISTANCE (FEET): 25

## 2021-11-05 ASSESSMENT — COGNITIVE AND FUNCTIONAL STATUS - GENERAL
CLIMB 3 TO 5 STEPS WITH RAILING: A LITTLE
MOVING TO AND FROM BED TO CHAIR: UNABLE
STANDING UP FROM CHAIR USING ARMS: A LITTLE
DAILY ACTIVITIY SCORE: 21
DRESSING REGULAR LOWER BODY CLOTHING: A LOT
MOVING FROM LYING ON BACK TO SITTING ON SIDE OF FLAT BED: UNABLE
TURNING FROM BACK TO SIDE WHILE IN FLAT BAD: UNABLE
SUGGESTED CMS G CODE MODIFIER DAILY ACTIVITY: CJ
SUGGESTED CMS G CODE MODIFIER MOBILITY: CL
HELP NEEDED FOR BATHING: A LITTLE
WALKING IN HOSPITAL ROOM: A LITTLE
MOBILITY SCORE: 12

## 2021-11-05 ASSESSMENT — ACTIVITIES OF DAILY LIVING (ADL): TOILETING: INDEPENDENT

## 2021-11-05 NOTE — PROGRESS NOTES
Pt AO x 4  Pt is Slovak speaking,  used for all meds and assessment  Vitals signs stable  Pt denies chest pain or SOB  O2 sat >90% on 2L breathing unlabored,  monitoring in place   Pt endorses 4/10 pain, PCA in place with bolus dose of dilauded available. Pt educated on use of PCA.   Pt *** N/V/D  *** voiding, *** flatus, *** BM  Pt ambulates ***  SCDs ***  Updated plan of care discussed with pt. Safety education done. Falls precautions in place.   Pt safety maintained. Hourly rounding done.

## 2021-11-05 NOTE — THERAPY
Occupational Therapy   Initial Evaluation     Patient Name: Kenyon Hewitt  Age:  66 y.o., Sex:  male  Medical Record #: [id number]  Today's Date: 11/5/2021       Precautions: Fall Risk    Assessment    Patient is 66 y.o. male s/p ex lap resection of abdominal mass. Pt seen for OT eval; iPad  used. Pt trained on logroll for bed mobility. Reports he has recliner at home he can sleep in if needed. Pt able to mobilize in and beyond room using FWW. Pt was total A to manage sock. Educated on option of sock-aid use. Pt reports he prefers to have assist from family until able to complete himself. Educated daughter on provided initial supv with tub transfer and bathing. Pt has good family support and is completing most ADL and transfers with no more than supv in this setting. No further acute OT needs at this time.       Plan    Recommend Occupational Therapy for Evaluation only    DC Equipment Recommendations: None  Discharge Recommendations: Anticipate that the patient will have no further occupational therapy needs after discharge from the hospital      Objective       11/05/21 1114   Prior Living Situation   Housing / Facility 2 Story House   Steps In Home   (full flight )   Rail Left Rail (Steps in Home)   Elevator No   Bathroom Set up Bathtub / Shower Combination   Equipment Owned None   Lives with - Patient's Self Care Capacity Adult Children;Child Less than 18 Years of Age;Spouse   Comments Pt lives with spouse, daughter, SOFY, grandkids. Family can assist as needed on DC.    Prior Level of ADL Function   Comments Pt was independent with BADL PTA   Prior Level of IADL Function   Comments Pt was independent with I-ADL and functional mobility PTA   Balance Assessment   Sitting Balance (Static) Good   Sitting Balance (Dynamic) Fair +   Standing Balance (Static) Fair   Standing Balance (Dynamic) Fair   Weight Shift Sitting Good   Weight Shift Standing Fair   Comments FWW for standing    Bed Mobility     Supine to Sit Minimal Assist  (HOB slightly elevated, used rail (pt has recliner at home))   Sit to Supine   (NT; up to chair post )   Scooting Supervised  (seated)   ADL Assessment   Grooming Modified Independent;Seated  (oral care in chair )   Lower Body Dressing Moderate Assist  (B socks; pt prefers to have help from family )   Toileting   (NT; declined need)   Functional Mobility   Sit to Stand Supervised   Bed, Chair, Wheelchair Transfer Supervised   Transfer Method Stand Step  (FWW)   Mobility Supine > EOB, short gait and transfers with FWW

## 2021-11-05 NOTE — THERAPY
Physical Therapy   Initial Evaluation     Patient Name: Kenyon Hewitt  Age:  66 y.o., Sex:  male  Medical Record #: [id number]  Today's Date: 11/5/2021     Precautions  Precautions: (P) Fall Risk  Comments: (P) PCA, abdominal incision    Assessment  Pt presents with impaired activity tolerance and pain s/p ex lap with tumor removal attached to peritoneum. Pt is most limited by first time up, able to ambulate household distances with FWW and SBA, will have 24/7 support from family on discharge but will need to stairs next session to enter his bedroom; does still have PCA on as well and may have greater difficulty mobilizing once removed, however anticipate pt will still be able to return home. Will follow.     Plan    Recommend Physical Therapy 4 times per week until therapy goals are met for the following treatments:  Bed Mobility, Equipment, Gait Training, Manual Therapy, Neuro Re-Education / Balance, Self Care/Home Evaluation, Sensory Integration Techniques, Stair Training, Therapeutic Activities and Therapeutic Exercises    DC Equipment Recommendations: Unable to determine at this time  Discharge Recommendations: anticipate pt to have no further physical therapy needs at discharge       Abridged Subjective/Objective     11/05/21 1201   Prior Living Situation   Prior Services None;Home-Independent   Housing / Facility 2 Story House   Steps Into Home 0   Steps In Home 10   Rail Left Rail (Steps in Home)   Equipment Owned None   Lives with - Patient's Self Care Capacity Adult Children;Child Less than 18 Years of Age;Spouse   Comments 24/7 support available; does not work; denies falls    Prior Level of Functional Mobility   Bed Mobility Independent   Transfer Status Independent   Ambulation Independent   Distance Ambulation (Feet)   (to tolerance)   Assistive Devices Used None   Stairs Independent   Cognition    Cognition / Consciousness WDL   Level of Consciousness Alert   Comments pleasant and  cooperative; wife at bedside,  utilized    Passive ROM Lower Body   Passive ROM Lower Body WDL   Strength Lower Body   Lower Body Strength  WDL   Sensation Lower Body   Lower Extremity Sensation   WDL   Balance Assessment   Sitting Balance (Static) Good   Sitting Balance (Dynamic) Good   Standing Balance (Static) Fair +   Standing Balance (Dynamic) Fair   Weight Shift Sitting Good   Weight Shift Standing Fair   Comments B UE support in sittnig/standing; initial loss of balance with stance when attempting without FWW or support; first stand    Gait Analysis   Gait Level Of Assist Supervised   Assistive Device Front Wheel Walker   Distance (Feet) 250   # of Times Distance was Traveled 1   Deviation Decreased Base Of Support   Weight Bearing Status full   Vision Deficits Impacting Mobility denies    Comments distance limited by therapist; pt denies issues throughout;    Bed Mobility    Supine to Sit Supervised  (CGA)   Sit to Supine   (NT, sitting in chair post )   Functional Mobility   Sit to Stand Supervised  (with FWW)   Bed, Chair, Wheelchair Transfer Supervised  (with FWW)   Edema / Skin Assessment   Edema / Skin  X   Comments abdomen dressing c/d/i    Patient / Family Goals    Patient / Family Goal #1 to improve activity tolerance    Short Term Goals    Short Term Goal # 1 Pt will perform supine<>sit from flat HOB/no railing with supervision within 6 visits (if needed, does have a recliner option downstairs).    Short Term Goal # 2 Pt will perform sit<>stand with FWW and supervision within 6 visits to ensure independent mobility at home.    Short Term Goal # 3 pt will ambulate x 150ft with fWW and supervision within 6 visits to ensure independent moblity at home.    Short Term Goal # 4 Pt will perform ascend/descend 10 stairs with supervision within 6 visits to ensure independent mobility at home.    Education Group   Role of Physical Therapist Patient Response  Patient;Acceptance;Explanation;Demonstration;Verbal Demonstration;Action Demonstration   Use of Assistive Device Patient Response Patient;Acceptance;Explanation;Demonstration;Action Demonstration;Verbal Demonstration   Exercises - Seated Patient Response Patient;Acceptance;Explanation;Demonstration;Verbal Demonstration;Action Demonstration

## 2021-11-05 NOTE — PROGRESS NOTES
Received report of patient at start of shift. Patient is AOx4, Mauritian speaking. Family members utilized for translation per patient request. PRN medications and PCA in use for pain management. Patient on 2L O2 via NC. Island dressing to abdomen, CDI. Patient previously up to chair with OT, resting in bed at this time. Morales catheter removed at 1150 per MD order, awaiting void. Patient encouraged to use call light for any needs/assistance. Plan of care discussed, safety education provided.

## 2021-11-05 NOTE — PROGRESS NOTES
2 RN skin assessment completed with Aurelia. Island dressing to mid abdomen, CDI. Band-aid to mid back, CDI. All other areas of skin intact. No signs of skin breakdown over bony prominences.

## 2021-11-05 NOTE — PROGRESS NOTES
Patient arrived to [facility] at approximately 1700. Patient is AOx4, family at bedside. Language line interpretor and daughter utilized for translation per patient request. Assessment complete, on 2L O2 via NC. Island dressing to mid abdomen, CDI. Morales catheter in place with +output. Patient oriented to room and surroundings, updated on plan of care. Safety education provided.

## 2021-11-05 NOTE — CARE PLAN
The patient is Stable - Low risk of patient condition declining or worsening    Shift Goals  Clinical Goals: pain managment     Progress made toward(s) clinical / shift goals:  Pt has PCA dilauded running per orders with bolus dose only. Pt educated on use of PCA with the , pt verbalized understanding. Cold therapy used in combination with PCA. Pt endorses 2-4/10 pain management with PCA.       Problem: Pain - Standard  Goal: Alleviation of pain or a reduction in pain to the patient’s comfort goal  Outcome: Progressing     Problem: Knowledge Deficit - Standard  Goal: Patient and family/care givers will demonstrate understanding of plan of care, disease process/condition, diagnostic tests and medications  Outcome: Progressing     Problem: Respiratory  Goal: Patient will achieve/maintain optimum respiratory ventilation and gas exchange  Outcome: Progressing     Problem: Bowel Elimination  Goal: Establish and maintain regular bowel function  Outcome: Progressing     Problem: Mobility  Goal: Patient's capacity to carry out activities will improve  Outcome: Progressing     Problem: Self Care  Goal: Patient will have the ability to perform ADLs independently or with assistance (bathe, groom, dress, toilet and feed)  Outcome: Progressing       Patient is not progressing towards the following goals:

## 2021-11-05 NOTE — PROGRESS NOTES
"Surgical Oncology Progress Note    Subjective:  Epidural removed in PACU yesterday and transitioned to PCA.  Per RN slept well all night - but did not hit PCA and woke up with pain this AM.  Minimal clears last night.  Ortiz remains in place    Objective:  /71   Pulse 60   Temp 36.7 °C (98 °F) (Temporal)   Resp 17   Ht 1.6 m (5' 3\")   Wt 80.3 kg (177 lb 0.5 oz)   SpO2 94%   BMI 31.36 kg/m²       Intake/Output Summary (Last 24 hours) at 11/5/2021 0709  Last data filed at 11/5/2021 0700  Gross per 24 hour   Intake 1699.73 ml   Output 2100 ml   Net -400.27 ml        Net IO Since Admission: -400.27 mL [11/05/21 0709]     Exam:  General: AAOx3, appropriate in conversation    Pulmonary: 1L NC.  No increase WOB  CV: hemodynamically acceptable   Abdomen:  Soft, appropriately TTP.  Surgical dressing c/d/i  MSK: No peripheral edema, extremities warm      Assessment/Plan:  Patient is a 65 y/o M admitted 11/4 for ex-lap, resection intra-abdominal tumor, partial omentectomy, IOUS with Dr. Bain    POD#: 1    -Pain control with PCA - encourage patient to use as needed.  Celebrex  -Aggressive pulmonary toileting, wean O2 to off    -Hemodynamics acceptable    -Continue full liquids  -Titrate IVF to UOP - currently 75/h  -Adequate UOP.  D/c ortiz today, void check  -Replace lytes prn with goal K>4, Phos >3, Mg>2  -H/H stable, lovenox to start today   -OOB with ambulation  -Dispo: AROBF, pain control  -PPx: lovenox, SCDs   -FULL CODE    Plan of care has been discussed with patient who is in agreement with the plan.  Bedside RN updated.    Julianna Dc MD  November 5, 2021, 7:09 AM      "

## 2021-11-06 LAB
ALBUMIN SERPL BCP-MCNC: 3.9 G/DL (ref 3.2–4.9)
ALBUMIN/GLOB SERPL: 1.3 G/DL
ALP SERPL-CCNC: 69 U/L (ref 30–99)
ALT SERPL-CCNC: 17 U/L (ref 2–50)
ANION GAP SERPL CALC-SCNC: 12 MMOL/L (ref 7–16)
AST SERPL-CCNC: 13 U/L (ref 12–45)
BILIRUB SERPL-MCNC: 0.6 MG/DL (ref 0.1–1.5)
BUN SERPL-MCNC: 14 MG/DL (ref 8–22)
CALCIUM SERPL-MCNC: 9.4 MG/DL (ref 8.5–10.5)
CHLORIDE SERPL-SCNC: 100 MMOL/L (ref 96–112)
CO2 SERPL-SCNC: 26 MMOL/L (ref 20–33)
CREAT SERPL-MCNC: 0.69 MG/DL (ref 0.5–1.4)
ERYTHROCYTE [DISTWIDTH] IN BLOOD BY AUTOMATED COUNT: 41.6 FL (ref 35.9–50)
GLOBULIN SER CALC-MCNC: 3 G/DL (ref 1.9–3.5)
GLUCOSE BLD-MCNC: 153 MG/DL (ref 65–99)
GLUCOSE SERPL-MCNC: 158 MG/DL (ref 65–99)
HCT VFR BLD AUTO: 41.2 % (ref 42–52)
HGB BLD-MCNC: 14.2 G/DL (ref 14–18)
MCH RBC QN AUTO: 31.5 PG (ref 27–33)
MCHC RBC AUTO-ENTMCNC: 34.5 G/DL (ref 33.7–35.3)
MCV RBC AUTO: 91.4 FL (ref 81.4–97.8)
PLATELET # BLD AUTO: 179 K/UL (ref 164–446)
PMV BLD AUTO: 8.8 FL (ref 9–12.9)
POTASSIUM SERPL-SCNC: 3.8 MMOL/L (ref 3.6–5.5)
PROT SERPL-MCNC: 6.9 G/DL (ref 6–8.2)
RBC # BLD AUTO: 4.51 M/UL (ref 4.7–6.1)
SODIUM SERPL-SCNC: 138 MMOL/L (ref 135–145)
WBC # BLD AUTO: 7 K/UL (ref 4.8–10.8)

## 2021-11-06 PROCEDURE — A9270 NON-COVERED ITEM OR SERVICE: HCPCS | Performed by: SURGERY

## 2021-11-06 PROCEDURE — 99024 POSTOP FOLLOW-UP VISIT: CPT | Performed by: SURGERY

## 2021-11-06 PROCEDURE — 80053 COMPREHEN METABOLIC PANEL: CPT

## 2021-11-06 PROCEDURE — 36415 COLL VENOUS BLD VENIPUNCTURE: CPT

## 2021-11-06 PROCEDURE — 700111 HCHG RX REV CODE 636 W/ 250 OVERRIDE (IP): Performed by: SURGERY

## 2021-11-06 PROCEDURE — 82962 GLUCOSE BLOOD TEST: CPT

## 2021-11-06 PROCEDURE — 700102 HCHG RX REV CODE 250 W/ 637 OVERRIDE(OP): Performed by: SURGERY

## 2021-11-06 PROCEDURE — 85027 COMPLETE CBC AUTOMATED: CPT

## 2021-11-06 PROCEDURE — 770001 HCHG ROOM/CARE - MED/SURG/GYN PRIV*

## 2021-11-06 RX ADMIN — DOCUSATE SODIUM 100 MG: 100 CAPSULE ORAL at 17:00

## 2021-11-06 RX ADMIN — ACETAMINOPHEN 1000 MG: 500 TABLET ORAL at 04:43

## 2021-11-06 RX ADMIN — Medication: at 03:40

## 2021-11-06 RX ADMIN — POLYETHYLENE GLYCOL 3350 1 PACKET: 17 POWDER, FOR SOLUTION ORAL at 04:44

## 2021-11-06 RX ADMIN — INSULIN HUMAN 2 UNITS: 100 INJECTION, SOLUTION PARENTERAL at 17:05

## 2021-11-06 RX ADMIN — DOCUSATE SODIUM 100 MG: 100 CAPSULE ORAL at 04:43

## 2021-11-06 RX ADMIN — ENOXAPARIN SODIUM 40 MG: 40 INJECTION SUBCUTANEOUS at 08:37

## 2021-11-06 RX ADMIN — INSULIN HUMAN 2 UNITS: 100 INJECTION, SOLUTION PARENTERAL at 06:38

## 2021-11-06 RX ADMIN — INSULIN HUMAN 2 UNITS: 100 INJECTION, SOLUTION PARENTERAL at 12:06

## 2021-11-06 RX ADMIN — ACETAMINOPHEN 1000 MG: 500 TABLET ORAL at 15:06

## 2021-11-06 RX ADMIN — INSULIN HUMAN 2 UNITS: 100 INJECTION, SOLUTION PARENTERAL at 20:31

## 2021-11-06 RX ADMIN — CELECOXIB 200 MG: 200 CAPSULE ORAL at 04:43

## 2021-11-06 ASSESSMENT — PAIN DESCRIPTION - PAIN TYPE
TYPE: SURGICAL PAIN
TYPE: ACUTE PAIN;SURGICAL PAIN
TYPE: SURGICAL PAIN
TYPE: ACUTE PAIN;SURGICAL PAIN
TYPE: SURGICAL PAIN

## 2021-11-06 NOTE — PROGRESS NOTES
Report received from Day RN at 1915. Assumed care of patient. Plan of care discussed, questions answered. Assessment completed. VSS, A&O x4, denies chest pain or SOB at this time, states abdominal pain. See MAR. Call light in reach. Patient educated on use of call light for assistance.    French speaking only  Dilaudid PCA in place  COVID-19 surge in effect

## 2021-11-06 NOTE — PROGRESS NOTES
Surgical Progress Note    Author: Tyrone Bain M.D. 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  Peritoneal to be done but pending pathology.  Pain much better controlled with a 2 out of 10.  PCA only on demand n.p.o. pain meds  Tolerating a general diet but not very hungry    Review of Systems   Constitutional: Positive for malaise/fatigue.   All other systems reviewed and are negative.    Hemodynamics:  Temp (24hrs), Av.4 °C (97.5 °F), Min:36.1 °C (97 °F), Max:37 °C (98.6 °F)  Temperature: 37 °C (98.6 °F)  Pulse  Av.5  Min: 54  Max: 80   Blood Pressure : 139/79     Respiratory:    Respiration: 16, Pulse Oximetry: 96 %        RUL Breath Sounds: Clear, RML Breath Sounds: Diminished, RLL Breath Sounds: Diminished, GWEN Breath Sounds: Clear, LLL Breath Sounds: Diminished  Neuro:  GCS       Fluids:    Intake/Output Summary (Last 24 hours) at 2021 0944  Last data filed at 2021 0345  Gross per 24 hour   Intake 407.2 ml   Output 550 ml   Net -142.8 ml        Current Diet Order   Procedures   • Diet Order Diet: Consistent CHO (Diabetic)     Physical Exam  Vitals and nursing note reviewed.   Cardiovascular:      Rate and Rhythm: Normal rate and regular rhythm.      Pulses: Normal pulses.      Heart sounds: Normal heart sounds.   Pulmonary:      Effort: Pulmonary effort is normal.      Breath sounds: Normal breath sounds.   Abdominal:      General: Abdomen is flat. Bowel sounds are normal. There is distension.      Palpations: Abdomen is soft.      Tenderness: There is abdominal tenderness.      Comments: 2 out of 10 pain       Labs:  Recent Results (from the past 24 hour(s))   POCT glucose device results    Collection Time: 21 11:51 AM   Result Value Ref Range    Glucose - Accu-Ck 143 (H) 65 -  99 mg/dL   POCT glucose device results    Collection Time: 11/05/21  5:39 PM   Result Value Ref Range    Glucose - Accu-Ck 135 (H) 65 - 99 mg/dL   POCT glucose device results    Collection Time: 11/05/21  9:56 PM   Result Value Ref Range    Glucose - Accu-Ck 146 (H) 65 - 99 mg/dL   CBC without Differential    Collection Time: 11/06/21  4:22 AM   Result Value Ref Range    WBC 7.0 4.8 - 10.8 K/uL    RBC 4.51 (L) 4.70 - 6.10 M/uL    Hemoglobin 14.2 14.0 - 18.0 g/dL    Hematocrit 41.2 (L) 42.0 - 52.0 %    MCV 91.4 81.4 - 97.8 fL    MCH 31.5 27.0 - 33.0 pg    MCHC 34.5 33.7 - 35.3 g/dL    RDW 41.6 35.9 - 50.0 fL    Platelet Count 179 164 - 446 K/uL    MPV 8.8 (L) 9.0 - 12.9 fL   Comp Metabolic Panel (CMP)    Collection Time: 11/06/21  4:22 AM   Result Value Ref Range    Sodium 138 135 - 145 mmol/L    Potassium 3.8 3.6 - 5.5 mmol/L    Chloride 100 96 - 112 mmol/L    Co2 26 20 - 33 mmol/L    Anion Gap 12.0 7.0 - 16.0    Glucose 158 (H) 65 - 99 mg/dL    Bun 14 8 - 22 mg/dL    Creatinine 0.69 0.50 - 1.40 mg/dL    Calcium 9.4 8.5 - 10.5 mg/dL    AST(SGOT) 13 12 - 45 U/L    ALT(SGPT) 17 2 - 50 U/L    Alkaline Phosphatase 69 30 - 99 U/L    Total Bilirubin 0.6 0.1 - 1.5 mg/dL    Albumin 3.9 3.2 - 4.9 g/dL    Total Protein 6.9 6.0 - 8.2 g/dL    Globulin 3.0 1.9 - 3.5 g/dL    A-G Ratio 1.3 g/dL   ESTIMATED GFR    Collection Time: 11/06/21  4:22 AM   Result Value Ref Range    GFR If African American >60 >60 mL/min/1.73 m 2    GFR If Non African American >60 >60 mL/min/1.73 m 2   POCT glucose device results    Collection Time: 11/06/21  6:33 AM   Result Value Ref Range    Glucose - Accu-Ck 153 (H) 65 - 99 mg/dL     Medical Decision Making, by Problem:  Active Hospital Problems    Diagnosis    • 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 pain  meds  Ambulate    Quality Measures:  Quality-Core Measures    Discussed patient condition with Family and RN

## 2021-11-06 NOTE — CARE PLAN
Problem: Pain - Standard  Goal: Alleviation of pain or a reduction in pain to the patient’s comfort goal  Outcome: Progressing     Problem: Self Care  Goal: Patient will have the ability to perform ADLs independently or with assistance (bathe, groom, dress, toilet and feed)  Outcome: Progressing     Problem: Wound/ / Incision Healing  Goal: Patient's wound/surgical incision will decrease in size and heals properly  Outcome: Progressing   The patient is Stable - Low risk of patient condition declining or worsening    Shift Goals  Clinical Goals: Pain managment  Patient Goals: rest    Progress made toward(s) clinical / shift goals: Patient is resting and ambulating throughout shift.     Patient is not progressing towards the following goals:

## 2021-11-06 NOTE — CARE PLAN
Problem: Pain - Standard  Goal: Alleviation of pain or a reduction in pain to the patient’s comfort goal  Outcome: Progressing     Problem: Knowledge Deficit - Standard  Goal: Patient and family/care givers will demonstrate understanding of plan of care, disease process/condition, diagnostic tests and medications  Outcome: Progressing   The patient is Stable - Low risk of patient condition declining or worsening    Shift Goals  Clinical Goals: pain managment     Progress made toward(s) clinical / shift goals:  pain control,  iPad in use for language barrier, POC discussed, questions answered.    Patient is not progressing towards the following goals:

## 2021-11-06 NOTE — CARE PLAN
The patient is Stable - Low risk of patient condition declining or worsening    Progress made toward(s) clinical / shift goals:  PCA medications in use for pain control. Received order for additional dose of Dilaudid IV push. Patient reports pain controlled at this time. -flatus, frequent ambulation encouraged.     Problem: Pain - Standard  Goal: Alleviation of pain or a reduction in pain to the patient’s comfort goal  Outcome: Progressing     Problem: Bowel Elimination  Goal: Establish and maintain regular bowel function  Outcome: Progressing

## 2021-11-07 LAB
ALBUMIN SERPL BCP-MCNC: 3.9 G/DL (ref 3.2–4.9)
ALBUMIN/GLOB SERPL: 1.1 G/DL
ALP SERPL-CCNC: 76 U/L (ref 30–99)
ALT SERPL-CCNC: 15 U/L (ref 2–50)
ANION GAP SERPL CALC-SCNC: 10 MMOL/L (ref 7–16)
AST SERPL-CCNC: 17 U/L (ref 12–45)
BILIRUB SERPL-MCNC: 0.6 MG/DL (ref 0.1–1.5)
BUN SERPL-MCNC: 10 MG/DL (ref 8–22)
CALCIUM SERPL-MCNC: 9.7 MG/DL (ref 8.5–10.5)
CHLORIDE SERPL-SCNC: 104 MMOL/L (ref 96–112)
CO2 SERPL-SCNC: 28 MMOL/L (ref 20–33)
CREAT SERPL-MCNC: 0.87 MG/DL (ref 0.5–1.4)
ERYTHROCYTE [DISTWIDTH] IN BLOOD BY AUTOMATED COUNT: 41.2 FL (ref 35.9–50)
GLOBULIN SER CALC-MCNC: 3.4 G/DL (ref 1.9–3.5)
GLUCOSE BLD-MCNC: 143 MG/DL (ref 65–99)
GLUCOSE BLD-MCNC: 150 MG/DL (ref 65–99)
GLUCOSE BLD-MCNC: 161 MG/DL (ref 65–99)
GLUCOSE BLD-MCNC: 184 MG/DL (ref 65–99)
GLUCOSE SERPL-MCNC: 171 MG/DL (ref 65–99)
HCT VFR BLD AUTO: 44.9 % (ref 42–52)
HGB BLD-MCNC: 15.2 G/DL (ref 14–18)
MCH RBC QN AUTO: 30.8 PG (ref 27–33)
MCHC RBC AUTO-ENTMCNC: 33.9 G/DL (ref 33.7–35.3)
MCV RBC AUTO: 91.1 FL (ref 81.4–97.8)
PLATELET # BLD AUTO: 193 K/UL (ref 164–446)
PMV BLD AUTO: 8.6 FL (ref 9–12.9)
POTASSIUM SERPL-SCNC: 4 MMOL/L (ref 3.6–5.5)
PROT SERPL-MCNC: 7.3 G/DL (ref 6–8.2)
RBC # BLD AUTO: 4.93 M/UL (ref 4.7–6.1)
SODIUM SERPL-SCNC: 142 MMOL/L (ref 135–145)
WBC # BLD AUTO: 5.3 K/UL (ref 4.8–10.8)

## 2021-11-07 PROCEDURE — 700102 HCHG RX REV CODE 250 W/ 637 OVERRIDE(OP): Performed by: SURGERY

## 2021-11-07 PROCEDURE — A9270 NON-COVERED ITEM OR SERVICE: HCPCS | Performed by: SURGERY

## 2021-11-07 PROCEDURE — 85027 COMPLETE CBC AUTOMATED: CPT

## 2021-11-07 PROCEDURE — 82962 GLUCOSE BLOOD TEST: CPT | Mod: 91

## 2021-11-07 PROCEDURE — 80053 COMPREHEN METABOLIC PANEL: CPT

## 2021-11-07 PROCEDURE — 700111 HCHG RX REV CODE 636 W/ 250 OVERRIDE (IP): Performed by: SURGERY

## 2021-11-07 PROCEDURE — 770001 HCHG ROOM/CARE - MED/SURG/GYN PRIV*

## 2021-11-07 RX ORDER — HYDROMORPHONE HYDROCHLORIDE 1 MG/ML
0.5 INJECTION, SOLUTION INTRAMUSCULAR; INTRAVENOUS; SUBCUTANEOUS
Status: DISCONTINUED | OUTPATIENT
Start: 2021-11-07 | End: 2021-11-08 | Stop reason: HOSPADM

## 2021-11-07 RX ADMIN — ACETAMINOPHEN 1000 MG: 500 TABLET ORAL at 04:36

## 2021-11-07 RX ADMIN — INSULIN HUMAN 2 UNITS: 100 INJECTION, SOLUTION PARENTERAL at 11:33

## 2021-11-07 RX ADMIN — ENOXAPARIN SODIUM 40 MG: 40 INJECTION SUBCUTANEOUS at 10:41

## 2021-11-07 RX ADMIN — INSULIN HUMAN 2 UNITS: 100 INJECTION, SOLUTION PARENTERAL at 16:57

## 2021-11-07 RX ADMIN — POLYETHYLENE GLYCOL 3350 1 PACKET: 17 POWDER, FOR SOLUTION ORAL at 04:36

## 2021-11-07 RX ADMIN — DOCUSATE SODIUM 100 MG: 100 CAPSULE ORAL at 16:49

## 2021-11-07 RX ADMIN — TRAMADOL HYDROCHLORIDE 100 MG: 50 TABLET ORAL at 13:52

## 2021-11-07 RX ADMIN — CELECOXIB 200 MG: 200 CAPSULE ORAL at 04:36

## 2021-11-07 RX ADMIN — ACETAMINOPHEN 1000 MG: 500 TABLET ORAL at 13:50

## 2021-11-07 RX ADMIN — MAGNESIUM HYDROXIDE 30 ML: 400 SUSPENSION ORAL at 10:41

## 2021-11-07 RX ADMIN — DOCUSATE SODIUM 100 MG: 100 CAPSULE ORAL at 04:36

## 2021-11-07 RX ADMIN — HYDROMORPHONE HYDROCHLORIDE 0.5 MG: 1 INJECTION, SOLUTION INTRAMUSCULAR; INTRAVENOUS; SUBCUTANEOUS at 17:00

## 2021-11-07 ASSESSMENT — PAIN DESCRIPTION - PAIN TYPE
TYPE: ACUTE PAIN
TYPE: ACUTE PAIN;SURGICAL PAIN
TYPE: ACUTE PAIN;SURGICAL PAIN
TYPE: ACUTE PAIN
TYPE: ACUTE PAIN;SURGICAL PAIN
TYPE: ACUTE PAIN;SURGICAL PAIN
TYPE: ACUTE PAIN

## 2021-11-07 NOTE — PROGRESS NOTES
"Assumed care of patient from night shift Rn.  Patient is alert and oriented times 4, states pain of 5/10, declines intervention at this time.  VSS /77   Pulse 72   Temp 37.2 °C (99 °F) (Temporal)   Resp 17   Ht 1.6 m (5' 3\")   Wt 80.3 kg (177 lb 0.5 oz)   SpO2 95%   BMI 31.36 kg/m²   PIV in the LFA, patent and saline locked.  On 2L oxygen with saturations in the mid 90s.  Last BM PTA, -gas per patient.  Encouraged frequent ambulation.  Urinating without difficulty.  Diabetic diet, tolerating well.  MLI with island dressing, CDI.  Patient is a SBA with a FWW, demonstrates steady gait, minimal assistance needed.  POC discussed for the day, bed is locked and in the lowest position, call light is within reach.  All needs are met at this time, hourly rounding is in place.    "

## 2021-11-07 NOTE — CARE PLAN
Problem: Pain - Standard  Goal: Alleviation of pain or a reduction in pain to the patient’s comfort goal  Outcome: Progressing     Problem: Knowledge Deficit - Standard  Goal: Patient and family/care givers will demonstrate understanding of plan of care, disease process/condition, diagnostic tests and medications  Outcome: Progressing     Problem: Respiratory  Goal: Patient will achieve/maintain optimum respiratory ventilation and gas exchange  Outcome: Progressing     Problem: Bowel Elimination  Goal: Establish and maintain regular bowel function  Outcome: Progressing     Problem: Mobility  Goal: Patient's capacity to carry out activities will improve  Outcome: Progressing   The patient is Stable - Low risk of patient condition declining or worsening    Shift Goals  Clinical Goals: pain control, ambulation with staff  Patient Goals: pain control  Family Goals: pain control    Progress made toward(s) clinical / shift goals:  pain control, mobilization with staff    Patient is not progressing towards the following goals:

## 2021-11-07 NOTE — PROGRESS NOTES
Report received from Day RN at 1915. Assumed care of patient. Plan of care discussed, questions answered. Assessment completed. VSS, A&O x4, denies chest pain or SOB at this time, states abdominal pain. See MAR. Call light in reach. Patient educated on use of call light for assistance.     - flatus, - BM  Patient refused ambulation this shift. Educations provided.  Uzbek speaking only  Dilaudid PCA in place  COVID-19 surge in effect

## 2021-11-08 ENCOUNTER — PATIENT OUTREACH (OUTPATIENT)
Dept: HEALTH INFORMATION MANAGEMENT | Facility: OTHER | Age: 66
End: 2021-11-08

## 2021-11-08 ENCOUNTER — PHARMACY VISIT (OUTPATIENT)
Dept: PHARMACY | Facility: MEDICAL CENTER | Age: 66
End: 2021-11-08
Payer: COMMERCIAL

## 2021-11-08 VITALS
SYSTOLIC BLOOD PRESSURE: 155 MMHG | WEIGHT: 177.03 LBS | HEIGHT: 63 IN | OXYGEN SATURATION: 92 % | DIASTOLIC BLOOD PRESSURE: 75 MMHG | RESPIRATION RATE: 18 BRPM | BODY MASS INDEX: 31.37 KG/M2 | HEART RATE: 74 BPM | TEMPERATURE: 98.5 F

## 2021-11-08 LAB
ALBUMIN SERPL BCP-MCNC: 3.5 G/DL (ref 3.2–4.9)
ALBUMIN/GLOB SERPL: 1.2 G/DL
ALP SERPL-CCNC: 62 U/L (ref 30–99)
ALT SERPL-CCNC: 13 U/L (ref 2–50)
ANION GAP SERPL CALC-SCNC: 10 MMOL/L (ref 7–16)
AST SERPL-CCNC: 13 U/L (ref 12–45)
BILIRUB SERPL-MCNC: 0.6 MG/DL (ref 0.1–1.5)
BUN SERPL-MCNC: 9 MG/DL (ref 8–22)
CALCIUM SERPL-MCNC: 9.1 MG/DL (ref 8.5–10.5)
CHLORIDE SERPL-SCNC: 102 MMOL/L (ref 96–112)
CO2 SERPL-SCNC: 27 MMOL/L (ref 20–33)
CREAT SERPL-MCNC: 0.77 MG/DL (ref 0.5–1.4)
ERYTHROCYTE [DISTWIDTH] IN BLOOD BY AUTOMATED COUNT: 40.7 FL (ref 35.9–50)
GLOBULIN SER CALC-MCNC: 2.9 G/DL (ref 1.9–3.5)
GLUCOSE BLD-MCNC: 136 MG/DL (ref 65–99)
GLUCOSE BLD-MCNC: 143 MG/DL (ref 65–99)
GLUCOSE BLD-MCNC: 156 MG/DL (ref 65–99)
GLUCOSE BLD-MCNC: 156 MG/DL (ref 65–99)
GLUCOSE BLD-MCNC: 170 MG/DL (ref 65–99)
GLUCOSE SERPL-MCNC: 133 MG/DL (ref 65–99)
HCT VFR BLD AUTO: 38.8 % (ref 42–52)
HGB BLD-MCNC: 13.3 G/DL (ref 14–18)
MCH RBC QN AUTO: 30.5 PG (ref 27–33)
MCHC RBC AUTO-ENTMCNC: 34.3 G/DL (ref 33.7–35.3)
MCV RBC AUTO: 89 FL (ref 81.4–97.8)
PLATELET # BLD AUTO: 204 K/UL (ref 164–446)
PMV BLD AUTO: 8.7 FL (ref 9–12.9)
POTASSIUM SERPL-SCNC: 3.5 MMOL/L (ref 3.6–5.5)
PROT SERPL-MCNC: 6.4 G/DL (ref 6–8.2)
RBC # BLD AUTO: 4.36 M/UL (ref 4.7–6.1)
SODIUM SERPL-SCNC: 139 MMOL/L (ref 135–145)
WBC # BLD AUTO: 4.3 K/UL (ref 4.8–10.8)

## 2021-11-08 PROCEDURE — 97535 SELF CARE MNGMENT TRAINING: CPT

## 2021-11-08 PROCEDURE — 85027 COMPLETE CBC AUTOMATED: CPT

## 2021-11-08 PROCEDURE — 82962 GLUCOSE BLOOD TEST: CPT

## 2021-11-08 PROCEDURE — 80053 COMPREHEN METABOLIC PANEL: CPT

## 2021-11-08 PROCEDURE — 99024 POSTOP FOLLOW-UP VISIT: CPT | Performed by: SURGERY

## 2021-11-08 PROCEDURE — A9270 NON-COVERED ITEM OR SERVICE: HCPCS | Performed by: SURGERY

## 2021-11-08 PROCEDURE — 700102 HCHG RX REV CODE 250 W/ 637 OVERRIDE(OP): Performed by: SURGERY

## 2021-11-08 PROCEDURE — RXMED WILLOW AMBULATORY MEDICATION CHARGE: Performed by: SURGERY

## 2021-11-08 PROCEDURE — 700111 HCHG RX REV CODE 636 W/ 250 OVERRIDE (IP): Performed by: SURGERY

## 2021-11-08 RX ORDER — CELECOXIB 200 MG/1
200 CAPSULE ORAL 2 TIMES DAILY
Qty: 60 CAPSULE | Refills: 1 | Status: SHIPPED | OUTPATIENT
Start: 2021-11-08 | End: 2022-01-29

## 2021-11-08 RX ORDER — CELECOXIB 200 MG/1
200 CAPSULE ORAL DAILY
Qty: 7 CAPSULE | Refills: 0 | Status: SHIPPED | OUTPATIENT
Start: 2021-11-09 | End: 2021-11-16

## 2021-11-08 RX ORDER — ACETAMINOPHEN 500 MG
500-1000 TABLET ORAL EVERY 6 HOURS PRN
Qty: 30 TABLET | Refills: 0 | Status: SHIPPED | OUTPATIENT
Start: 2021-11-08

## 2021-11-08 RX ORDER — BISACODYL 10 MG
10 SUPPOSITORY, RECTAL RECTAL DAILY
Status: DISCONTINUED | OUTPATIENT
Start: 2021-11-08 | End: 2021-11-08 | Stop reason: HOSPADM

## 2021-11-08 RX ORDER — TRAMADOL HYDROCHLORIDE 50 MG/1
50 TABLET ORAL EVERY 6 HOURS PRN
Qty: 28 TABLET | Refills: 0 | Status: SHIPPED | OUTPATIENT
Start: 2021-11-08 | End: 2021-11-15

## 2021-11-08 RX ORDER — ACETAMINOPHEN 500 MG
1000 TABLET ORAL EVERY 8 HOURS
Qty: 30 TABLET | Refills: 0 | Status: SHIPPED | OUTPATIENT
Start: 2021-11-08

## 2021-11-08 RX ORDER — TRAMADOL HYDROCHLORIDE 50 MG/1
50-100 TABLET ORAL EVERY 4 HOURS PRN
Qty: 50 TABLET | Refills: 0 | Status: SHIPPED | OUTPATIENT
Start: 2021-11-08 | End: 2021-11-18

## 2021-11-08 RX ADMIN — MAGNESIUM HYDROXIDE 30 ML: 400 SUSPENSION ORAL at 05:41

## 2021-11-08 RX ADMIN — POLYETHYLENE GLYCOL 3350 1 PACKET: 17 POWDER, FOR SOLUTION ORAL at 05:41

## 2021-11-08 RX ADMIN — CELECOXIB 200 MG: 200 CAPSULE ORAL at 05:41

## 2021-11-08 RX ADMIN — TRAMADOL HYDROCHLORIDE 50 MG: 50 TABLET ORAL at 09:31

## 2021-11-08 RX ADMIN — DOCUSATE SODIUM 100 MG: 100 CAPSULE ORAL at 05:41

## 2021-11-08 RX ADMIN — ENOXAPARIN SODIUM 40 MG: 40 INJECTION SUBCUTANEOUS at 09:26

## 2021-11-08 ASSESSMENT — GAIT ASSESSMENTS
DISTANCE (FEET): 500
ASSISTIVE DEVICE: FRONT WHEEL WALKER
GAIT LEVEL OF ASSIST: SUPERVISED
DEVIATION: NO DEVIATION

## 2021-11-08 ASSESSMENT — PAIN DESCRIPTION - PAIN TYPE
TYPE: ACUTE PAIN
TYPE: ACUTE PAIN

## 2021-11-08 ASSESSMENT — COGNITIVE AND FUNCTIONAL STATUS - GENERAL
MOVING TO AND FROM BED TO CHAIR: A LITTLE
CLIMB 3 TO 5 STEPS WITH RAILING: A LITTLE
SUGGESTED CMS G CODE MODIFIER MOBILITY: CK
TURNING FROM BACK TO SIDE WHILE IN FLAT BAD: A LITTLE
STANDING UP FROM CHAIR USING ARMS: A LITTLE
MOBILITY SCORE: 18
WALKING IN HOSPITAL ROOM: A LITTLE
MOVING FROM LYING ON BACK TO SITTING ON SIDE OF FLAT BED: A LITTLE

## 2021-11-08 NOTE — CARE PLAN
The patient is Stable - Low risk of patient condition declining or worsening    Shift Goals  Clinical Goals: ambulation, pain control  Patient Goals: pain control  Family Goals: pain control    Progress made toward(s) clinical / shift goals:  PT/OT signed off, patient pain managed with medication, patient ambulatory with FWW, patient educated and prepared for discharge    Patient is not progressing towards the following goals:

## 2021-11-08 NOTE — PROGRESS NOTES
Received report from previous shift RN  Assessment complete.  A&O x 4. Patient calls appropriately.  Patient ambulates with standy assist with FWW. Bed alarm off.   Patient has 4/10 pain. Pain management declined at this time.  Denies N&V. Tolerating diabetic diet.  Surgical incision open to air, clean, dry, and intact.  + void, + flatus, + BM this AM x2.  Patient denies SOB.  SCD's off, pt ambulates.  Patient's wife at bedside.  Review plan with of care with patient. Call light and personal belongings with in reach. Hourly rounding in place. All needs met at this time.

## 2021-11-08 NOTE — DISCHARGE INSTRUCTIONS
Surgical Oncology Discharge Instructions      Kenyon Hewitt   Age: 66 y.o.   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  (ie - ok to walk, climb stairs, ect)  • Do not engage in strenuous activity that may place stress on the operative site for 7-10 days.   • Do not lift more than 10 pounds for the next 4-6 weeks.   • It is important that you are up walking several times a day to decrease the chance of getting a blood clot     Diet:  Resume regular diabetic diet as tolerated. To help prevent post operative nausea and vomiting avoid spicy or greasy foods; eat small, frequent meals and maintain adequate water/fluid intake.    Medication(s):   Current Discharge Medication List      START taking these medications    Details   acetaminophen (TYLENOL) 500 MG Tab Take 2 Tablets by mouth every 8 hours.  Qty: 30 Tablet, Refills: 0    Associated Diagnoses: Rhabdomyosarcoma of pelvis (HCC)      celecoxib (CELEBREX) 200 MG Cap Take 1 Capsule by mouth every day for 7 days.  Qty: 7 Capsule, Refills: 0    Associated Diagnoses: Rhabdomyosarcoma of pelvis (HCC)      traMADol (ULTRAM) 50 MG Tab Take 1 Tablet by mouth every 6 hours as needed (pain) for up to 7 days. DO NOT take with norco.  Qty: 28 Tablet, Refills: 0    Associated Diagnoses: Rhabdomyosarcoma of pelvis (HCC)         CONTINUE these medications which have  NOT CHANGED    Details   GAVILYTE-G 236 g Recon Soln Take 4 L by mouth one time.      SYNJARDY 12.5-1000 MG Tab Take 1 Tablet by mouth 2 times a day.      atorvastatin (LIPITOR) 40 MG Tab Take 40 mg by mouth every evening.      busPIRone (BUSPAR) 10 MG Tab Take 10 mg by mouth 3 times a day as needed (For anxiety).      lisinopril (PRINIVIL) 5 MG Tab Take 5 mg by mouth every day.         STOP taking these medications       HYDROcodone-acetaminophen (NORCO) 5-325 MG Tab per tablet Comments:   Reason for Stopping:              See prescription(s); take as directed.   • You do NOT have to take all of your prescription pain medication.  Take only as needed if pain not controlled with over the counter medications (ex: tylenol, ibuprofen, ect).    • If you are taking narcotic pain medication be sure to take a stool softener (available over the counter - ex: miralax, magnesium citrate, dulcolax/bisacoidyl, ect)  to prevent constipation.    Additionally make sure that you are taking in enough fluids.  • Do NOT drive or make any important decision while taking prescription pain medication.    Please DO NOT take tramadol (new prescription) with the other pain medication (Norco) that you have at home.  OK to take over the counter tylenol at home as needed with the tramadol.      Wound Care:     Your incision has been covered with: staples which will be removed in clinic.  • Do NOT apply any creams, lotions, ect to the incision unless you have been specifically instructed to do so by your surgical team.    • It is normal to have a small amount of clear/yellow/pink drainage from you incision as it heals.    • Please call the surgical clinic if you have:              -increasing drainage from incision              -change in the color of drainage from you incision              -redness surrounding the incision which extends more than 1 fingerbreadth (1 centimeter) from the incision edge  • Avoid exposing your incision to the  sun    Bathing/Showering:  Please shower daily starting in 24 hours. You may wash over incisions with regular soap and water and pat dry.  Ok to allow water from the shower to run over you incision.  Avoid submerging you incision under water (ie - no baths, swimming or hot tubs) until fully healed.        Suggestions for post discharge nausea and/or vomiting:  Limit your mobility, take slow, deep breaths.      Restrictions:  • No smoking  • No alcohol while taking prescription pain medications  • No strenuous activity until cleared by surgery clinic  • No heavy lifting (more than 10 lbs) for at least the next 4-6 weeks  • No driving while taking prescription pain medication  • No driving until you have the mobility to be a safe     Additional Instructions:  • If you experience chest pain or shortness of breath, or have an acute emergency - please call 911    • Please call clinic or seek evaluation at the ER if you have any of the following:  -Fever >101.5     -Wound infection (increasing redness, swelling, drainage, pus)     -Severe pain not controlled with oral medications     -Severe nausea or vomiting, inability to tolerate PO intake     -Bleeding that does not stop with holding pressure to the area       -Yellowing of the skin or eyes     -Change in mental status (confusion or lethargy)      -New numbness or weakness      -Any other concerns.    Follow-up:    • Please follow-up in Dr. Bain's clinic in 2 weeks    • Please call clinic to confirm appointment [phone]  • Please keep all follow-up appointments    Disposition:  To home    Discharge Instructions    Discharged to home by car with relative. Discharged via wheelchair, hospital escort: Yes.  Special equipment needed: Not Applicable    Be sure to schedule a follow-up appointment with your primary care doctor or any specialists as instructed.     Discharge Plan:   Influenza Vaccine Indication: Not indicated: Previously immunized this  influenza season and > 8 years of age    I understand that a diet low in cholesterol, fat, and sodium is recommended for good health. Unless I have been given specific instructions below for another diet, I accept this instruction as my diet prescription.   Other diet: Diabetic diet    Special Instructions: None    · Is patient discharged on Warfarin / Coumadin?   No       Exploratory Laparotomy, Adult, Care After  This sheet gives you information about how to care for yourself after your procedure. Your health care provider may also give you more specific instructions. If you have problems or questions, contact your health care provider.  What can I expect after the procedure?  After the procedure, it is common to have:  · Abdominal soreness.  · Fatigue.  · A sore throat from the tube in your throat.  · A lack of appetite.  Follow these instructions at home:  Medicines  · Take over-the-counter and prescription medicines only as told by your health care provider.  · If you were prescribed an antibiotic medicine, take it as told by your health care provider. Do not stop taking the antibiotic even if you start to feel better.  · Do not drive or operate heavy machinery while taking pain medicine.  · If you are taking prescription pain medicine, take actions to prevent or treat constipation. Your health care provider may recommend that you:  ? Drink enough fluid to keep your urine pale yellow.  ? Eat foods that are high in fiber, such as fresh fruits and vegetables, whole grains, and beans.  ? Limit foods that are high in fat and processed sugars, such as fried or sweet foods.  ? Take an over-the-counter or prescription medicine for constipation. Undergoing surgery and taking pain medicines can make constipation worse.  Incision care    · Follow instructions from your health care provider about how to take care of your incision. Make sure you:  ? Wash your hands with soap and water before you change your bandage  (dressing). If soap and water are not available, use hand .  ? Change your dressing as told by your health care provider.  ? Leave stitches (sutures), skin glue, or adhesive strips in place. These skin closures may need to stay in place for 2 weeks or longer. If adhesive strip edges start to loosen and curl up, you may trim the loose edges. Do not remove adhesive strips completely unless your health care provider tells you to do that.  · If you were sent home with a drain, follow instructions from your health care provider about how to care for it.  · Check your incision area every day for signs of infection. Check for:  ? Redness, swelling, or pain.  ? Fluid or blood.  ? Warmth.  ? Pus or a bad smell.  Activity    · Rest as told by your health care provider.  ? Avoid sitting for a long time without moving. Get up to take short walks every 1-2 hours. This is important to improve blood flow and breathing. Ask for help if you feel weak or unsteady.  · Do not lift anything that is heavier than 5 lb (2.2 kg), or the limit that your health care provider tells you, until he or she says that it is safe.  · Ask your health care provider when you can start to do your usual activities again, such as driving, going back to work, and having sex.  Eating and drinking  · You may eat what you usually eat. Include lots of whole grains, fruits, and vegetables in your diet. This will help to prevent constipation.  · Drink enough fluid to keep your urine pale yellow.  Bathing  · Keep your incision clean and dry. Clean it as often as told by your health care provider:  ? Gently wash the incision with soap and water.  ? Rinse the incision with water to remove all soap.  ? Pat the incision dry with a clean towel. Do not rub the incision.  · You may take showers after 48 hours.  · Do not take baths, swim, or use a hot tub until your health care provider says it is okay to do so.  General instructions  · Do not use any products  that contain nicotine or tobacco, such as cigarettes and e-cigarettes. These can delay healing after surgery. If you need help quitting, ask your health care provider.  · Wear compression stockings as told by your health care provider. These stockings help to prevent blood clots and reduce swelling in your legs.  · Keep all follow-up visits as told by your health care provider. This is important.  Contact a health care provider if:  · You have a fever.  · You have chills.  · Your pain medicine is not helping.  · You have constipation or diarrhea.  · You have nausea or vomiting.  · You have drainage, redness, swelling, or pain at your incision site.  Get help right away if:  · Your pain is getting worse.  · You have not had a bowel movement for more than 3 days.  · You have ongoing (persistent) vomiting.  · The edges of your incision open up.  · You have warmth, tenderness, and swelling in your calf.  · You have trouble breathing.  · You have chest pain.  These symptoms may represent a serious problem that is an emergency. Do not wait to see if the symptoms will go away. Get medical help right away. Call your local emergency services (911 in the [place]). Do not drive yourself to the hospital.  Summary  · Abdominal soreness is common after exploratory laparotomy. Take over-the-counter and prescription pain medicines only as told by your health care provider.  · Follow instructions from your health care provider about how to take care of your incision. Do not take baths, swim, or use a hot tub until your health care provider says it is okay to do so.  · Watch for signs and symptoms of infection after surgery, including fever, chills, drainage from your incision, and worsening abdominal pain.  This information is not intended to replace advice given to you by your health care provider. Make sure you discuss any questions you have with your health care provider.  Document Released: 08/01/2005 Document Revised:  02/10/2020 Document Reviewed: 12/28/2018  Elsevier Patient Education © 2020 [employer]      Depression / Suicide Risk    As you are discharged from this [facility] facility, it is important to learn how to keep safe from harming yourself.    Recognize the warning signs:  · Abrupt changes in personality, positive or negative- including increase in energy   · Giving away possessions  · Change in eating patterns- significant weight changes-  positive or negative  · Change in sleeping patterns- unable to sleep or sleeping all the time   · Unwillingness or inability to communicate  · Depression  · Unusual sadness, discouragement and loneliness  · Talk of wanting to die  · Neglect of personal appearance   · Rebelliousness- reckless behavior  · Withdrawal from people/activities they love  · Confusion- inability to concentrate     If you or a loved one observes any of these behaviors or has concerns about self-harm, here's what you can do:  · Talk about it- your feelings and reasons for harming yourself  · Remove any means that you might use to hurt yourself (examples: pills, rope, extension cords, firearm)  · Get professional help from the community (Mental Health, Substance Abuse, psychological counseling)  · Do not be alone:Call your Safe Contact- someone whom you trust who will be there for you.  · Call your local CRISIS HOTLINE 362-4588 or 298-499-4637  · Call your local Children's Mobile Crisis Response Team [place] (223) 740-6566 or www.[employer].com  · Call the toll free National Suicide Prevention Hotlines   · National Suicide Prevention Lifeline 025-681-COGQ (9988)  · National Hope Line Network 800-SUICIDE (521-7146)

## 2021-11-08 NOTE — DISCHARGE SUMMARY
Discharge Summary      Reason for Admission  RETROPERITONEAL SARCOMA     Admission Date  11/4/2021    CODE STATUS  Full Code    HPI & HOSPITAL COURSE  The patient presented to the hospital on 11/4/2021 and underwent exploratory laparotomy, resection retroperitoneal margin, IOUS, omentectomy with Dr. Bain.  Post-operatively was admitted to the floor.  Brief review of hospital course as follows:    Neuro: Initial epidural placed was not satisfactory for pain control and was removed in the PACU and transitioned to PCA.  This was subsequently transitioned to PO pain medications.  Pain controlled at time of discharge.  Patient given strict instruction to NOT take his tramadol (new prescription) with his home norco.  Pulmonary: Initial post-operative supplemental oxygen weaned to off.  Stable on room air at time of discharge.  Cardiac: Hemodynamically acceptable throughout hospital course.  Abdomen: Diet slowly advanced post-operatively.  Tolerating a diabetic regular diet at time of discharge with full return of bowel function  Wound: Incisions clean dry and intact at time of discharge.  Staples in place and will be removed in clinic.  Drains: None    At time of discharge patient had pain controlled with PO medications, was tolerating a regular diet with return of bowel function.  They were able to demonstrate appropriate  wound care and were deemed appropriate for discharge.    Therefore, he is discharged in good and stable condition to home with close outpatient follow-up.      Discharge Date  11/8/2021    FOLLOW UP ITEMS POST DISCHARGE  -Final Pathology Pending - will be discussed when returns to clinic.    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 *      FOLLOW UP  With Dr. Bain in clinic in 2 weeks.  Please call clinic to make  appointment    MEDICATIONS ON DISCHARGE     Medication List      START taking these medications      Instructions   acetaminophen 500 MG Tabs  Commonly known as: TYLENOL   Take 2 Tablets by mouth every 8 hours.  Dose: 1,000 mg     celecoxib 200 MG Caps  Start taking on: November 9, 2021  Commonly known as: CELEBREX   Take 1 Capsule by mouth every day for 7 days.  Dose: 200 mg     traMADol 50 MG Tabs  Commonly known as: ULTRAM   Take 1 Tablet by mouth every 6 hours as needed (pain) for up to 7 days. DO NOT take with norco.  Dose: 50 mg        CONTINUE taking these medications      Instructions   atorvastatin 40 MG Tabs  Commonly known as: LIPITOR   Take 40 mg by mouth every evening.  Dose: 40 mg     busPIRone 10 MG Tabs tablet  Commonly known as: BUSPAR   Take 10 mg by mouth 3 times a day as needed (For anxiety).  Dose: 10 mg     GaviLyte-G 236 GM Solr  Generic drug: polyethylene glycol-electrolytes   Take 4 L by mouth one time.  Dose: 4 L     lisinopril 5 MG Tabs  Commonly known as: PRINIVIL   Take 5 mg by mouth every day.  Dose: 5 mg     Synjardy 12.5-1000 MG Tabs  Generic drug: Empagliflozin-metFORMIN HCl   Take 1 Tablet by mouth 2 times a day.  Dose: 1 Tablet        STOP taking these medications    HYDROcodone-acetaminophen 5-325 MG Tabs per tablet  Commonly known as: NORCO            Allergies  Allergies   Allergen Reactions   • Nkda [No Known Drug Allergy]        DIET  Orders Placed This Encounter   Procedures   • Diet Order Diet: Consistent CHO (Diabetic)     Standing Status:   Standing     Number of Occurrences:   1     Order Specific Question:   ERAS     Answer:   Yes     Order Specific Question:   Diet:     Answer:   Consistent CHO (Diabetic) [4]       ACTIVITY  As tolerated.  No heavy lifting > 10 lbs for at least 4 weeks after surgery    CONSULTATIONS  None      LABORATORY  Lab Results   Component Value Date    SODIUM 139 11/08/2021    POTASSIUM 3.5 (L) 11/08/2021    CHLORIDE 102 11/08/2021    CO2 27  11/08/2021    GLUCOSE 133 (H) 11/08/2021    BUN 9 11/08/2021    CREATININE 0.77 11/08/2021        Lab Results   Component Value Date    WBC 4.3 (L) 11/08/2021    HEMOGLOBIN 13.3 (L) 11/08/2021    HEMATOCRIT 38.8 (L) 11/08/2021    PLATELETCT 204 11/08/2021

## 2021-11-08 NOTE — THERAPY
"Physical Therapy   Daily Treatment     Patient Name: Kenyon Hewitt  Age:  66 y.o., Sex:  male  Medical Record #: [id number]  Today's Date: 11/8/2021     Precautions: Fall Risk  Comments: abdominal incision    Assessment  Pt seen for PT treatment session. Pt at SPV level for all functional mobility: no difficulty with bed mobility, FWW for STS and ambulation. Pt appears functionally capable to DC from PT standpoint and pt has no concerns with DC at this time. Pt will have access to bed and shower on first floor at home. Patient will not be actively followed for physical therapy services at this time, however may be seen if requested by physician for 1 more visit within 30 days to address any discharge or equipment needs.    Plan  Discharge secondary to goals met.  DC Equipment Recommendations: Front-Wheel Walker  Discharge Recommendations: Anticipate that the patient will have no further physical therapy needs after discharge from the hospital         11/08/21 0841   Vitals   O2 Delivery Device None - Room Air   Pain 0 - 10 Group   Location Abdomen   Location Orientation Mid   Pain Rating Scale (NPRS) \"very little\"   Description Aching   Therapist Pain Assessment During Activity   Cognition    Cognition / Consciousness WDL   Level of Consciousness Alert   Comments pleasant and participatory   Balance   Sitting Balance (Static) Good   Sitting Balance (Dynamic) Good   Standing Balance (Static) Fair +   Standing Balance (Dynamic) Fair   Weight Shift Sitting Good   Weight Shift Standing Good   Skilled Intervention Verbal Cuing   Comments FWW in standing   Gait Analysis   Gait Level Of Assist Supervised   Assistive Device Front Wheel Walker   Distance (Feet) 500   # of Times Distance was Traveled 1   Deviation No deviation   Weight Bearing Status no restrictions   Skilled Intervention Verbal Cuing   Comments no LOB, pt states he does not need to negotiate stairs at home, will bed and shower available on 1st " "floor   Bed Mobility    Supine to Sit Supervised   Scooting Supervised   Rolling Supervised   Skilled Intervention Verbal Cuing   Comments HOB elevated, used bedrails, pt states he will have an \"army bed\" at home to sleep in initially   Functional Mobility   Sit to Stand Supervised   Skilled Intervention Verbal Cuing   Comments FWW   Short Term Goals    Short Term Goal # 1 Pt will perform supine<>sit from flat HOB/no railing with supervision within 6 visits (if needed, does have a recliner option downstairs).    Goal Outcome # 1 Goal met   Short Term Goal # 2 Pt will perform sit<>stand with FWW and supervision within 6 visits to ensure independent mobility at home.    Goal Outcome # 2 Goal met   Short Term Goal # 3 pt will ambulate x 150ft with fWW and supervision within 6 visits to ensure independent moblity at home.    Goal Outcome # 3 Goal met   Short Term Goal # 4 Pt will perform ascend/descend 10 stairs with supervision within 6 visits to ensure independent mobility at home.    Goal Outcome # 4 Other (see comments)  (pt states he does not need to negotiate stairs at home)         "

## 2021-11-08 NOTE — DISCHARGE PLANNING
Meds-to-Beds: Discharge prescription orders listed below delivered to patient in discharge lounge. RN Nika notified. Patient counseled.      Current Outpatient Medications   Medication Sig Dispense Refill   • traMADol (ULTRAM) 50 MG Tab Take 1-2 Tablets by mouth every four hours as needed for Severe Pain for up to 10 days. 50 Tablet 0   • celecoxib (CELEBREX) 200 MG Cap Take 1 Capsule by mouth 2 times a day. 60 Capsule 1   • acetaminophen (TYLENOL) 500 MG Tab Take 1-2 Tablets by mouth every 6 hours as needed. 30 Tablet 0      Shannon Caal, PharmD

## 2021-11-08 NOTE — PROGRESS NOTES
Report received from Day RN at 1915. Assumed care of patient. Plan of care discussed, questions answered. Assessment completed. VSS, A&O x4, denies chest pain or SOB at this time, states abdominal pain. See MAR. Call light in reach. Patient educated on use of call light for assistance.     + flatus, - BM  Patient refused ambulation this shift. Educations provided.  Malaysian speaking only    COVID-19 surge in effect

## 2021-11-08 NOTE — PROGRESS NOTES
D/C'd with FWW. Ipad  used # [id number].  Discharge instructions provided to pt.  Pt states understanding.  Pt states all questions have been answered.  Copy of discharge provided to pt.  Signed copy in chart.  Prescriptions provided to pt per meds to beds. Pt states that all personal belongings are in possession.   Pt escorted off unit with assistance from this RN and CNA without incident.

## 2021-11-08 NOTE — PROGRESS NOTES
Patient alert, tolerating PO. Denies nausea, pain well controlled with PRN medications.  Transferred to [facility] in stable condition with wife and all belongings.

## 2021-11-18 ENCOUNTER — NON-PROVIDER VISIT (OUTPATIENT)
Dept: SURGERY | Facility: MEDICAL CENTER | Age: 66
End: 2021-11-18
Payer: MEDICARE

## 2021-11-18 NOTE — NON-PROVIDER
Kenyon Hewitt is here for a Non-Provider Visit for Staple Removal.     Staple were placed by Dr. Bain on date: 11/4/2021  Skin is healed: Yes  Provider notified if skin is not healed, or if there is redness, heat, pain, or drainage from incision: Yes  Staple removed.   Steristips are placed: Yes     Advised to keep clean and dry, wash with anti-bacterial soap and water, avoid any creams or lotions.

## 2021-11-22 NOTE — PROGRESS NOTES
Subjective:   11/23/2021  8:22 AM  Primary care physician:Yan Powell D.O.  Referring Provider: Luis Cantu MD   Medical Oncologist: Luis Cantu MD     Chief Complaint:   Chief Complaint   Patient presents with   • Follow-Up     PO/STAPLES  RESECTION  OF RETROPERITONEAL RHABDOMYSARCOMA      Diagnosis:   1. Rhabdomyosarcoma of pelvis (HCC)  traMADol (ULTRAM) 50 MG Tab   2. Pain due to neoplasm  traMADol (ULTRAM) 50 MG Tab       History of presenting illness:  Kenyon Hewitt  is a pleasant 66 y.o. year old male who presented with follow-up status post resection of a pseudotumor in his abdomen.  The patient was diagnosed with a mass in the abdomen was painful.  He was biopsied and came back as potentially rhabdomyosarcoma which was somewhat unusual for a 66-year-old with an intraperitoneal tumor.  During resection we found a bunch of matted omentum attached to the lateral sidewall.  There was no obvious tumor outside of that.  The omentum was resected along with the peritoneum and we explored the lateral pelvic wall and inferior pelvic wall with no sign of tumor masses we ran the small bowel as well.  The pathology from this came back as fibrotic tissue with plasma cells and peripheral atypical stromal cells.  They considered it a pseudotumor due to the inflammatory changes.  He does well.  He denies any fever chills, nausea or vomiting.  Staples are out and the incision looks great.  He did run out of tramadol so I am sending another prescription for 2 more weeks.  Is here with his wife to discuss neck steps using the official  iPad.      Past Medical History:   Diagnosis Date   • Backpain    • Cancer (HCC) 2021    Sarcoma   • Dental disorder     Upper and lower dentures   • Diabetes     Oral medications   • Diverticulosis    • Glaucoma     Bilateral   • Heart burn    • High cholesterol    • Hyperlipidemia    • Hypertension    • Pain 11/03/2021    Lower abdomen   • Parkinson's syndrome  ([facility])    • Psychiatric problem     Anxiety   • Sleep apnea     Does not wear CPAP; no O2   • Snoring    • Stroke ([facility]) 2014    Residual weakness on Right side   • Type II or unspecified type diabetes mellitus without mention of complication, uncontrolled 5/18/2010   • Vitiligo      Past Surgical History:   Procedure Laterality Date   • PB EXCISION/DESTRUCTION OPEN ABDOMINAL TUMORS >* N/A 11/4/2021    Procedure: EXCISION, RETROPERITONEUM - TUMOR;  Surgeon: Tyrone Bain M.D.;  Location: SURGERY [facility];  Service: General   • PB EXPLORATORY OF ABDOMEN  11/4/2021    Procedure: LAPAROTOMY, EXPLORATORY;  Surgeon: Tyrone Bain M.D.;  Location: SURGERY [facility];  Service: General   • PB ULTRASONIC GUIDANCE, INTRAOPERATIVE  11/4/2021    Procedure: INTRA OPERATIVE ULTRASOUND GUIDANCE;  Surgeon: Tyrone Bain M.D.;  Location: SURGERY [facility];  Service: General   • PB REMOVAL OF OMENTUM  11/4/2021    Procedure: OMENTECTOMY AND OMENTAL BIOPSY;  Surgeon: Tyrone Bain M.D.;  Location: SURGERY [facility];  Service: General     Allergies   Allergen Reactions   • Nkda [No Known Drug Allergy]      Outpatient Encounter Medications as of 11/23/2021   Medication Sig Dispense Refill   • traMADol (ULTRAM) 50 MG Tab Take 1-2 Tablets by mouth every 6 hours as needed for up to 14 days. 40 Tablet 0   • acetaminophen (TYLENOL) 500 MG Tab Take 2 Tablets by mouth every 8 hours. 30 Tablet 0   • celecoxib (CELEBREX) 200 MG Cap Take 1 Capsule by mouth 2 times a day. 60 Capsule 1   • acetaminophen (TYLENOL) 500 MG Tab Take 1-2 Tablets by mouth every 6 hours as needed. 30 Tablet 0   • GAVILYTE-G 236 g Recon Soln Take 4 L by mouth one time.     • SYNJARDY 12.5-1000 MG Tab Take 1 Tablet by mouth 2 times a day.     • atorvastatin (LIPITOR) 40 MG Tab Take 40 mg by mouth every evening.     • lisinopril (PRINIVIL) 5 MG Tab Take 5 mg by mouth every day.     • busPIRone (BUSPAR) 10 MG Tab Take 10  mg by mouth 3 times a day as needed (For anxiety).       No facility-administered encounter medications on file as of 2021.     Social History     Socioeconomic History   • Marital status:      Spouse name: Not on file   • Number of children: Not on file   • Years of education: Not on file   • Highest education level: Not on file   Occupational History   • Not on file   Tobacco Use   • Smoking status: Former Smoker     Years: 16.00     Types: Cigarettes     Quit date: 1990     Years since quittin.9   • Smokeless tobacco: Never Used   Vaping Use   • Vaping Use: Never used   Substance and Sexual Activity   • Alcohol use: No   • Drug use: No   • Sexual activity: Not on file   Other Topics Concern   • Not on file   Social History Narrative   • Not on file     Social Determinants of Health     Financial Resource Strain:    • Difficulty of Paying Living Expenses: Not on file   Food Insecurity:    • Worried About Running Out of Food in the Last Year: Not on file   • Ran Out of Food in the Last Year: Not on file   Transportation Needs:    • Lack of Transportation (Medical): Not on file   • Lack of Transportation (Non-Medical): Not on file   Physical Activity:    • Days of Exercise per Week: Not on file   • Minutes of Exercise per Session: Not on file   Stress:    • Feeling of Stress : Not on file   Social Connections:    • Frequency of Communication with Friends and Family: Not on file   • Frequency of Social Gatherings with Friends and Family: Not on file   • Attends Mu-ism Services: Not on file   • Active Member of Clubs or Organizations: Not on file   • Attends Club or Organization Meetings: Not on file   • Marital Status: Not on file   Intimate Partner Violence:    • Fear of Current or Ex-Partner: Not on file   • Emotionally Abused: Not on file   • Physically Abused: Not on file   • Sexually Abused: Not on file   Housing Stability:    • Unable to Pay for Housing in the Last Year: Not on file   •  Number of Places Lived in the Last Year: Not on file   • Unstable Housing in the Last Year: Not on file      Social History     Tobacco Use   Smoking Status Former Smoker   • Years: 16.00   • Types: Cigarettes   • Quit date: 1990   • Years since quittin.9   Smokeless Tobacco Never Used     Social History     Substance and Sexual Activity   Alcohol Use No     Social History     Substance and Sexual Activity   Drug Use No        Family History   Problem Relation Age of Onset   • Diabetes Mother    • Hypertension Mother    • Psychiatric Illness Father    • Diabetes Sister    • Diabetes Brother        Review of Systems   Gastrointestinal: Positive for abdominal pain.   All other systems reviewed and are negative.       Objective:   /62 (BP Location: Right arm, Patient Position: Sitting, BP Cuff Size: Adult)   Pulse 93   Temp 36.9 °C (98.5 °F) (Temporal)   Wt 78.7 kg (173 lb 6.4 oz)   SpO2 95%   BMI 30.72 kg/m²     Physical Exam  Vitals and nursing note reviewed.   Constitutional:       Appearance: Normal appearance.   HENT:      Head: Normocephalic.      Mouth/Throat:      Mouth: Mucous membranes are dry.   Eyes:      Extraocular Movements: Extraocular movements intact.      Pupils: Pupils are equal, round, and reactive to light.   Cardiovascular:      Rate and Rhythm: Normal rate.      Pulses: Normal pulses.      Heart sounds: Normal heart sounds.   Pulmonary:      Effort: Pulmonary effort is normal.      Breath sounds: Normal breath sounds.   Abdominal:      General: Abdomen is flat.      Palpations: Abdomen is soft.   Musculoskeletal:      Cervical back: Normal range of motion and neck supple.   Neurological:      General: No focal deficit present.      Mental Status: He is alert and oriented to person, place, and time.   Psychiatric:         Mood and Affect: Mood normal.         Behavior: Behavior normal.         Thought Content: Thought content normal.         Judgment: Judgment normal.          Labs:  Results for NAZANIN GEIGER (MRN [id number]) as of 11/22/2021 11:45   Ref. Range 11/8/2021 05:50   WBC Latest Ref Range: 4.8 - 10.8 K/uL 4.3 (L)   RBC Latest Ref Range: 4.70 - 6.10 M/uL 4.36 (L)   Hemoglobin Latest Ref Range: 14.0 - 18.0 g/dL 13.3 (L)   Hematocrit Latest Ref Range: 42.0 - 52.0 % 38.8 (L)   MCV Latest Ref Range: 81.4 - 97.8 fL 89.0   MCH Latest Ref Range: 27.0 - 33.0 pg 30.5   MCHC Latest Ref Range: 33.7 - 35.3 g/dL 34.3   RDW Latest Ref Range: 35.9 - 50.0 fL 40.7   Platelet Count Latest Ref Range: 164 - 446 K/uL 204   MPV Latest Ref Range: 9.0 - 12.9 fL 8.7 (L)   Sodium Latest Ref Range: 135 - 145 mmol/L 139   Potassium Latest Ref Range: 3.6 - 5.5 mmol/L 3.5 (L)   Chloride Latest Ref Range: 96 - 112 mmol/L 102   Co2 Latest Ref Range: 20 - 33 mmol/L 27   Anion Gap Latest Ref Range: 7.0 - 16.0  10.0   Glucose Latest Ref Range: 65 - 99 mg/dL 133 (H)   Bun Latest Ref Range: 8 - 22 mg/dL 9   Creatinine Latest Ref Range: 0.50 - 1.40 mg/dL 0.77   GFR If  Latest Ref Range: >60 mL/min/1.73 m 2 >60   GFR If Non  Latest Ref Range: >60 mL/min/1.73 m 2 >60   Calcium Latest Ref Range: 8.5 - 10.5 mg/dL 9.1   AST(SGOT) Latest Ref Range: 12 - 45 U/L 13   ALT(SGPT) Latest Ref Range: 2 - 50 U/L 13   Alkaline Phosphatase Latest Ref Range: 30 - 99 U/L 62   Total Bilirubin Latest Ref Range: 0.1 - 1.5 mg/dL 0.6   Albumin Latest Ref Range: 3.2 - 4.9 g/dL 3.5   Total Protein Latest Ref Range: 6.0 - 8.2 g/dL 6.4   Globulin Latest Ref Range: 1.9 - 3.5 g/dL 2.9   A-G Ratio Latest Units: g/dL 1.2       Imaging:10/12/21 PET/CT [facility]     Per my read,              9/18/21 [facility]               Pathology: 11/4/21    PRELIMINARY DIAGNOSIS:     A. Omentum tumor:          Pending expert consultation from [facility]          Fibrosis and inflammation rich in plasma cells with peripheral           atypical stromal cells, favor reactive   B. Retroperitoneum, posterior to  tumor:          Mature fibroadipose tissue with scattered foci of fibrosis and           chronic inflammation, negative for malignancy   C. Omentum:          Mature fibroadipose tissue, negative for malignancy     Procedures: 11/4/21    1.  Exploratory laparotomy.  2.  Resection of retroperitoneal margin with overlying peritoneum, less than 5 cm.  3.  Intraoperative ultrasound survey of the lateral rectus wall transverse abdominis muscle pelvis.  4.  Omentectomy due to finding the mass, which was suspicious for what was seen on CT attached to the lateral wall of the peritoneum, so we did an omentectomy.    Diagnosis:     1. Rhabdomyosarcoma of pelvis (HCC)  traMADol (ULTRAM) 50 MG Tab   2. Pain due to neoplasm  traMADol (ULTRAM) 50 MG Tab           Medical Decision Making:  Today's Assessment / Status / Plan:     In light of the present findings, the patient will undergo repeat CT scan in 6 months.  The pathology still pending from [facility] but fortunately the pathology result from this pseudotumor in the abdomen that we removed came back as significant fibrosis/pseudotumor with plasma cells and moderate atypia.  Once I get the results I will call him personally.  In the meantime we will repeat his CT scan in 6 months.    I, Dr. Bain have entered, reviewed and confirmed the above diagnoses related to this patient on this date of service, 11/23/2021  8:22 AM.    He agreed and verbalized his agreement and understanding with the current plan. I answered all questions and concerns he has at this time and advised him to call at any time in the interim with questions or concerns in regards to his care.    Thank you for allowing me to participate in his care, I will continue to follow closely.       Please note that this dictation was created using voice recognition software. I have made every reasonable attempt to correct obvious errors, but I expect that there are errors of grammar and possibly content that I did  not discover before finalizing the note.     Thank you for this consultation and allowing me to participate in your patient's care. If I can be of further service please contact my office.

## 2021-11-23 ENCOUNTER — OFFICE VISIT (OUTPATIENT)
Dept: SURGICAL ONCOLOGY | Facility: MEDICAL CENTER | Age: 66
End: 2021-11-23
Payer: MEDICARE

## 2021-11-23 VITALS
BODY MASS INDEX: 30.72 KG/M2 | SYSTOLIC BLOOD PRESSURE: 118 MMHG | DIASTOLIC BLOOD PRESSURE: 62 MMHG | TEMPERATURE: 98.5 F | OXYGEN SATURATION: 95 % | WEIGHT: 173.4 LBS | HEART RATE: 93 BPM

## 2021-11-23 DIAGNOSIS — G89.3 PAIN DUE TO NEOPLASM: ICD-10-CM

## 2021-11-23 DIAGNOSIS — C49.5: ICD-10-CM

## 2021-11-23 PROCEDURE — 99024 POSTOP FOLLOW-UP VISIT: CPT | Performed by: SURGERY

## 2021-11-23 RX ORDER — TRAMADOL HYDROCHLORIDE 50 MG/1
50-100 TABLET ORAL EVERY 6 HOURS PRN
Qty: 40 TABLET | Refills: 0 | Status: SHIPPED | OUTPATIENT
Start: 2021-11-23 | End: 2021-12-07

## 2021-11-23 ASSESSMENT — FIBROSIS 4 INDEX: FIB4 SCORE: 1.201850425154663098

## 2021-11-23 ASSESSMENT — ENCOUNTER SYMPTOMS: ABDOMINAL PAIN: 1

## 2021-12-16 ENCOUNTER — NON-PROVIDER VISIT (OUTPATIENT)
Dept: SURGERY | Facility: MEDICAL CENTER | Age: 66
End: 2021-12-16
Payer: MEDICARE

## 2022-01-14 NOTE — PROGRESS NOTES
Subjective:      Primary care physician: Yan Powell D.O.  Referring Provider: Luis Cantu MD   Medical Oncologist: Luis Cantu MD     Chief Complaint: No chief complaint on file.    Diagnosis:   1. Rhabdomyosarcoma of pelvis (HCC)     2. Abnormal CT of the abdomen     3. Pain due to neoplasm     4. S/P exploratory laparotomy       History of presenting illness:  Kenyon Hewitt  is a pleasant 66 year old male who presented with follow-up status post resection of a pseudotumor in his abdomen.  The patient was diagnosed with a mass in the abdomen causing abdominal pain.  He was biopsied and found to be potentially rhabdomyosarcoma which was somewhat unusual for a 66-year-old with an intraperitoneal tumor.  During resection we found extensive matted omentum attached to the lateral sidewall. There was no obvious tumor outside of that.  The omentum was resected along with the peritoneum and we explored the lateral pelvic wall and inferior pelvic wall with no sign of tumor masses we ran the small bowel as well.  The pathology from this came back as fibrotic tissue with plasma cells and peripheral atypical stromal cells.  They considered it a pseudotumor due to the inflammatory changes.      He previously visited the clinic for concerns about the abdominal incision, and protruding sutures. At that time the excess suture was trimmed, and reassurances provided to patient that wound healing would continue, but that he should monitor for any more problems.    Overall he states he has been doing well post the earlier surgery. He denies any fever chills, nausea or vomiting.  He is here today with his wife with concerns about a suture just under the skin at the umbilicus. He states it causes local irritation and that he can feel it under his clothes.    [place]awk speaking  used during the visit.      Past Medical History:   Diagnosis Date   • Backpain    • Cancer (HCC) 2021    Sarcoma   • Dental disorder      Upper and lower dentures   • Diabetes     Oral medications   • Diverticulosis    • Glaucoma     Bilateral   • Heart burn    • High cholesterol    • Hyperlipidemia    • Hypertension    • Pain 11/03/2021    Lower abdomen   • Parkinson's syndrome (HCC)    • Psychiatric problem     Anxiety   • Sleep apnea     Does not wear CPAP; no O2   • Snoring    • Stroke (HCC) 2014    Residual weakness on Right side   • Type II or unspecified type diabetes mellitus without mention of complication, uncontrolled 5/18/2010   • Vitiligo      Past Surgical History:   Procedure Laterality Date   • PB EXCISION/DESTRUCTION OPEN ABDOMINAL TUMORS >* N/A 11/4/2021    Procedure: EXCISION, RETROPERITONEUM - TUMOR;  Surgeon: Tyrone Bain M.D.;  Location: SURGERY [facility];  Service: General   • PB EXPLORATORY OF ABDOMEN  11/4/2021    Procedure: LAPAROTOMY, EXPLORATORY;  Surgeon: Tyrone Bain M.D.;  Location: SURGERY [facility];  Service: General   • PB ULTRASONIC GUIDANCE, INTRAOPERATIVE  11/4/2021    Procedure: INTRA OPERATIVE ULTRASOUND GUIDANCE;  Surgeon: Tyrone Bain M.D.;  Location: SURGERY [facility];  Service: General   • PB REMOVAL OF OMENTUM  11/4/2021    Procedure: OMENTECTOMY AND OMENTAL BIOPSY;  Surgeon: Tyrone Bain M.D.;  Location: SURGERY [facility];  Service: General     Allergies   Allergen Reactions   • Nkda [No Known Drug Allergy]      Outpatient Encounter Medications as of 1/18/2022   Medication Sig Dispense Refill   • acetaminophen (TYLENOL) 500 MG Tab Take 2 Tablets by mouth every 8 hours. 30 Tablet 0   • celecoxib (CELEBREX) 200 MG Cap Take 1 Capsule by mouth 2 times a day. 60 Capsule 1   • acetaminophen (TYLENOL) 500 MG Tab Take 1-2 Tablets by mouth every 6 hours as needed. 30 Tablet 0   • GAVILYTE-G 236 g Recon Soln Take 4 L by mouth one time.     • SYNJARDY 12.5-1000 MG Tab Take 1 Tablet by mouth 2 times a day.     • atorvastatin (LIPITOR) 40 MG Tab Take 40 mg  by mouth every evening.     • busPIRone (BUSPAR) 10 MG Tab Take 10 mg by mouth 3 times a day as needed (For anxiety).     • lisinopril (PRINIVIL) 5 MG Tab Take 5 mg by mouth every day.       No facility-administered encounter medications on file as of 2022.     Social History     Socioeconomic History   • Marital status:      Spouse name: Not on file   • Number of children: Not on file   • Years of education: Not on file   • Highest education level: Not on file   Occupational History   • Not on file   Tobacco Use   • Smoking status: Former Smoker     Years: 16.00     Types: Cigarettes     Quit date: 1990     Years since quittin.0   • Smokeless tobacco: Never Used   Vaping Use   • Vaping Use: Never used   Substance and Sexual Activity   • Alcohol use: No   • Drug use: No   • Sexual activity: Not on file   Other Topics Concern   • Not on file   Social History Narrative   • Not on file     Social Determinants of Health     Financial Resource Strain:    • Difficulty of Paying Living Expenses: Not on file   Food Insecurity:    • Worried About Running Out of Food in the Last Year: Not on file   • Ran Out of Food in the Last Year: Not on file   Transportation Needs:    • Lack of Transportation (Medical): Not on file   • Lack of Transportation (Non-Medical): Not on file   Physical Activity:    • Days of Exercise per Week: Not on file   • Minutes of Exercise per Session: Not on file   Stress:    • Feeling of Stress : Not on file   Social Connections:    • Frequency of Communication with Friends and Family: Not on file   • Frequency of Social Gatherings with Friends and Family: Not on file   • Attends [employer] Services: Not on file   • Active Member of Clubs or Organizations: Not on file   • Attends Club or Organization Meetings: Not on file   • Marital Status: Not on file   Intimate Partner Violence:    • Fear of Current or Ex-Partner: Not on file   • Emotionally Abused: Not on file   • Physically  "Abused: Not on file   • Sexually Abused: Not on file   Housing Stability:    • Unable to Pay for Housing in the Last Year: Not on file   • Number of Places Lived in the Last Year: Not on file   • Unstable Housing in the Last Year: Not on file      Social History     Tobacco Use   Smoking Status Former Smoker   • Years: 16.00   • Types: Cigarettes   • Quit date: 1990   • Years since quittin.0   Smokeless Tobacco Never Used     Social History     Substance and Sexual Activity   Alcohol Use No     Social History     Substance and Sexual Activity   Drug Use No        Family History   Problem Relation Age of Onset   • Diabetes Mother    • Hypertension Mother    • Psychiatric Illness Father    • Diabetes Sister    • Diabetes Brother      Review of Systems   Constitutional: Negative for chills, fever and malaise/fatigue.   Respiratory: Negative for cough and shortness of breath.    Cardiovascular: Negative for chest pain and palpitations.   Gastrointestinal: Negative for abdominal pain, nausea and vomiting.   Skin: Negative for itching and rash.   All other systems reviewed and are negative.       Objective:   /68 (BP Location: Right arm, Patient Position: Sitting, BP Cuff Size: Adult)   Pulse 100   Temp 37.1 °C (98.7 °F) (Temporal)   Ht 1.727 m (5' 8\")   Wt 80.3 kg (177 lb)   SpO2 95%   BMI 26.91 kg/m²     Physical Exam  Constitutional:       General: He is not in acute distress.     Appearance: Normal appearance. He is obese. He is not ill-appearing, toxic-appearing or diaphoretic.   HENT:      Head: Normocephalic and atraumatic.      Nose: Nose normal.      Mouth/Throat:      Pharynx: Oropharynx is clear.   Cardiovascular:      Rate and Rhythm: Normal rate.   Pulmonary:      Effort: Pulmonary effort is normal. No respiratory distress.   Abdominal:      Palpations: Abdomen is soft.      Comments: Well healed mid line abdominal incision. No erythema, swelling, bruising, tenderness noted. A single " "suture end can be palpated just to right of umbilicus. Skin is intact.   Neurological:      Mental Status: He is alert.         Labs:  Results for NAZANIN GEIGER (MRN [id number]) as of 1/14/2022 10:05   Ref. Range 11/8/2021 05:50   WBC Latest Ref Range: 4.8 - 10.8 K/uL 4.3 (L)   RBC Latest Ref Range: 4.70 - 6.10 M/uL 4.36 (L)   Hemoglobin Latest Ref Range: 14.0 - 18.0 g/dL 13.3 (L)   Hematocrit Latest Ref Range: 42.0 - 52.0 % 38.8 (L)   MCV Latest Ref Range: 81.4 - 97.8 fL 89.0   MCH Latest Ref Range: 27.0 - 33.0 pg 30.5   MCHC Latest Ref Range: 33.7 - 35.3 g/dL 34.3   RDW Latest Ref Range: 35.9 - 50.0 fL 40.7   Platelet Count Latest Ref Range: 164 - 446 K/uL 204   MPV Latest Ref Range: 9.0 - 12.9 fL 8.7 (L)   Sodium Latest Ref Range: 135 - 145 mmol/L 139   Potassium Latest Ref Range: 3.6 - 5.5 mmol/L 3.5 (L)   Chloride Latest Ref Range: 96 - 112 mmol/L 102   Co2 Latest Ref Range: 20 - 33 mmol/L 27   Anion Gap Latest Ref Range: 7.0 - 16.0  10.0   Glucose Latest Ref Range: 65 - 99 mg/dL 133 (H)   Bun Latest Ref Range: 8 - 22 mg/dL 9   Creatinine Latest Ref Range: 0.50 - 1.40 mg/dL 0.77   GFR If  Latest Ref Range: >60 mL/min/1.73 m 2 >60   GFR If Non  Latest Ref Range: >60 mL/min/1.73 m 2 >60   Calcium Latest Ref Range: 8.5 - 10.5 mg/dL 9.1   AST(SGOT) Latest Ref Range: 12 - 45 U/L 13   ALT(SGPT) Latest Ref Range: 2 - 50 U/L 13   Alkaline Phosphatase Latest Ref Range: 30 - 99 U/L 62   Total Bilirubin Latest Ref Range: 0.1 - 1.5 mg/dL 0.6   Albumin Latest Ref Range: 3.2 - 4.9 g/dL 3.5   Total Protein Latest Ref Range: 6.0 - 8.2 g/dL 6.4   Globulin Latest Ref Range: 1.9 - 3.5 g/dL 2.9   A-G Ratio Latest Units: g/dL 1.2       Imaging:  10/12/21 PET/CT [facility]           9/18/21 CT [facility]           Pathology:  SPECIMEN (11/4/2021) by Carlos Villarreal DO  FINAL DIAGNOSIS:     CONSULTANTS' DIAGNOSIS:     \"OMENTUM, RESECTION      -   FIBROADIPOSE TISSUE WITH REACTIVE CHANGES,  NO TUMOR SEEN\" "     Please see separate [facility] report for further   details. Dr. Carlos Villarreal reviewed the slides of this case prior to   requesting   consultative opinion by [facility].     A. Omentum tumor:          See [facility] report   B. Retroperitoneum, posterior to tumor:          Mature fibroadipose tissue with scattered foci of fibrosis and           chronic inflammation, negative for malignancy   C. Omentum:          Mature fibroadipose tissue, negative for malignancy     Procedures:  SURGERY (11/4/2021) by Tyrone Bain MD   1.  Exploratory laparotomy.  2.  Resection of retroperitoneal margin with overlying peritoneum, less than 5 cm.  3.  Intraoperative ultrasound survey of the lateral rectus wall transverse abdominis muscle pelvis.  4.  Omentectomy due to finding the mass, which was suspicious for what was seen on CT attached to the lateral wall of the peritoneum, so we did an omentectomy.      Diagnosis:     1. Rhabdomyosarcoma of pelvis (HCC)     2. Abnormal CT of the abdomen     3. Pain due to neoplasm     4. S/P exploratory laparotomy         Medical Decision Making:  Today's Assessment / Status / Plan:     Overall, it appears patient has recovered well from prior surgery a few months back. The incision seems healed well and is non tender, except for a pinpoint spot where what looks like a suture end is pushing up below the skin. The skin is intact, but faint tenting can be noticed. No hernia, masses, or dehiscence is noted.    Patient was reassured this is a suture, and that over time the suture will gradually resolve. But that is may take several months. Meanwhile, he was suggested to cover the spot with a soft dressing or tape to minimize local skin irritation. He also was instructed to follow up with the clinic if there is skin break, increased swelling, bruising, or worsening tenderness beyond the current spot. Patient was provided with initial  set of materials for a soft dressing.    He agreed and verbalized agreement and understanding with the current plan. I answered all questions and concerns at this time and advised to call at any time in the interim with questions or concerns in regards to care.     Thank you for allowing me to participate in his care, I will continue to follow closely.

## 2022-01-18 ENCOUNTER — OFFICE VISIT (OUTPATIENT)
Dept: SURGICAL ONCOLOGY | Facility: MEDICAL CENTER | Age: 67
End: 2022-01-18
Payer: MEDICARE

## 2022-01-18 VITALS
OXYGEN SATURATION: 95 % | SYSTOLIC BLOOD PRESSURE: 134 MMHG | BODY MASS INDEX: 26.83 KG/M2 | DIASTOLIC BLOOD PRESSURE: 68 MMHG | HEIGHT: 68 IN | TEMPERATURE: 98.7 F | HEART RATE: 100 BPM | WEIGHT: 177 LBS

## 2022-01-18 DIAGNOSIS — G89.3 PAIN DUE TO NEOPLASM: ICD-10-CM

## 2022-01-18 DIAGNOSIS — R93.5 ABNORMAL CT OF THE ABDOMEN: ICD-10-CM

## 2022-01-18 DIAGNOSIS — Z98.890 S/P EXPLORATORY LAPAROTOMY: ICD-10-CM

## 2022-01-18 DIAGNOSIS — C49.5: ICD-10-CM

## 2022-01-18 PROCEDURE — 99024 POSTOP FOLLOW-UP VISIT: CPT | Performed by: NURSE PRACTITIONER

## 2022-01-18 ASSESSMENT — ENCOUNTER SYMPTOMS
VOMITING: 0
NAUSEA: 0
FEVER: 0
PALPITATIONS: 0
SHORTNESS OF BREATH: 0
CHILLS: 0
COUGH: 0
ABDOMINAL PAIN: 0

## 2022-01-18 ASSESSMENT — FIBROSIS 4 INDEX: FIB4 SCORE: 1.201850425154663098

## 2022-01-22 ENCOUNTER — OFFICE VISIT (OUTPATIENT)
Dept: URGENT CARE | Facility: PHYSICIAN GROUP | Age: 67
End: 2022-01-22
Payer: MEDICARE

## 2022-01-22 ENCOUNTER — HOSPITAL ENCOUNTER (OUTPATIENT)
Facility: MEDICAL CENTER | Age: 67
End: 2022-01-22
Attending: FAMILY MEDICINE
Payer: MEDICARE

## 2022-01-22 VITALS
WEIGHT: 175 LBS | BODY MASS INDEX: 28.12 KG/M2 | RESPIRATION RATE: 24 BRPM | HEIGHT: 66 IN | OXYGEN SATURATION: 95 % | TEMPERATURE: 96.7 F | HEART RATE: 102 BPM | DIASTOLIC BLOOD PRESSURE: 64 MMHG | SYSTOLIC BLOOD PRESSURE: 122 MMHG

## 2022-01-22 DIAGNOSIS — U07.1 COVID-19: ICD-10-CM

## 2022-01-22 LAB
INT CON NEG: NEGATIVE
INT CON POS: POSITIVE
S PYO AG THROAT QL: NEGATIVE

## 2022-01-22 PROCEDURE — 99204 OFFICE O/P NEW MOD 45 MIN: CPT | Performed by: FAMILY MEDICINE

## 2022-01-22 PROCEDURE — 87880 STREP A ASSAY W/OPTIC: CPT | Performed by: FAMILY MEDICINE

## 2022-01-22 PROCEDURE — 0240U HCHG SARS-COV-2 COVID-19 NFCT DS RESP RNA 3 TRGT MIC: CPT

## 2022-01-22 RX ORDER — FINASTERIDE 5 MG/1
5 TABLET, FILM COATED ORAL DAILY
COMMUNITY
Start: 2021-12-27

## 2022-01-22 ASSESSMENT — FIBROSIS 4 INDEX: FIB4 SCORE: 1.201850425154663098

## 2022-01-23 NOTE — PROGRESS NOTES
CC:  cough        Cough  This is a new problem. The current episode started 3 days ago. The problem has been unchanged. The problem occurs constantly. The cough is dry. Associated symptoms include subj fever, fatigue,  nasal congestion. Pertinent negatives include no   headaches, nausea, vomiting, diarrhea, sweats, weight loss or wheezing. Nothing aggravates the symptoms.  Patient has tried nothing for the symptoms. There is no history of asthma.      + COVID Ag positive on             Current Outpatient Medications on File Prior to Visit   Medication Sig Dispense Refill   • acetaminophen (TYLENOL) 500 MG Tab Take 2 Tablets by mouth every 8 hours. 30 Tablet 0   • celecoxib (CELEBREX) 200 MG Cap Take 1 Capsule by mouth 2 times a day. 60 Capsule 1   • acetaminophen (TYLENOL) 500 MG Tab Take 1-2 Tablets by mouth every 6 hours as needed. 30 Tablet 0   • GAVILYTE-G 236 g Recon Soln Take 4 L by mouth one time.     • SYNJARDY 12.5-1000 MG Tab Take 1 Tablet by mouth 2 times a day.     • atorvastatin (LIPITOR) 40 MG Tab Take 40 mg by mouth every evening.     • busPIRone (BUSPAR) 10 MG Tab Take 10 mg by mouth 3 times a day as needed (For anxiety).     • lisinopril (PRINIVIL) 5 MG Tab Take 5 mg by mouth every day.       No current facility-administered medications on file prior to visit.         Social History     Tobacco Use   • Smoking status: Former Smoker     Years: 16.00     Types: Cigarettes     Quit date: 1990     Years since quittin.0   • Smokeless tobacco: Never Used   Vaping Use   • Vaping Use: Never used   Substance Use Topics   • Alcohol use: No   • Drug use: No           Review of Systems     HENT: negative for ear pain.    Cardiovascular - denies chest pain or dyspnea  Respiratory: Positive for cough.  .  Negative for wheezing.    Neurological: Negative for headaches.   GI - denies nausea, vomiting or diarrhea  Neuro - denies numbness or tingling.            Objective:     /64 (BP Location:  "Right arm, Patient Position: Sitting, BP Cuff Size: Adult)   Pulse (!) 102   Temp 35.9 °C (96.7 °F) (Temporal)   Resp (!) 24   Ht 1.676 m (5' 6\")   Wt 79.4 kg (175 lb)   SpO2 95%       Physical Exam   Constitutional: patient is oriented to person, place, and time. Patient appears well-developed and well-nourished. No distress.   HENT:   Head: Normocephalic and atraumatic.   Right Ear: External ear normal.   Left Ear: External ear normal.   Nose: Mucosal edema  present. Right sinus exhibits no maxillary sinus tenderness. Left sinus exhibits no maxillary sinus tenderness.   Mouth/Throat: Mucous membranes are normal. No oral lesions. No oropharyngeal exudate or posterior oropharyngeal edema.   Eyes: Conjunctivae and EOM are normal. Pupils are equal, round, and reactive to light. Right eye exhibits no discharge. Left eye exhibits no discharge. No scleral icterus.   Neck: Normal range of motion. Neck supple. No tracheal deviation present.   Cardiovascular: Normal rate, regular rhythm and normal heart sounds.  Exam reveals no friction rub.    Pulmonary/Chest: Effort normal. No respiratory distress. Patient has no wheezes or rhonchi. Patient has no rales.    Musculoskeletal:  exhibits no edema.   Lymphadenopathy:  No cervical LAD     Neurological: patient is alert and oriented to person, place, and time.   Skin: Skin is warm and dry. No rash noted. No erythema.   Psychiatric: patient  has a normal mood and affect.  behavior is normal.   Nursing note and vitals reviewed.         Admission on 11/04/2021, Discharged on 11/08/2021   Component Date Value Ref Range Status   • Glycohemoglobin 11/04/2021 7.3* 4.0 - 5.6 % Final    Comment: Increased risk for diabetes:  5.7 -6.4%  Diabetes:  >6.4%  Glycemic control for adults with diabetes:  <7.0%    The above interpretations are per ADA guidelines.  Diagnosis  of diabetes mellitus on the basis of elevated Hemoglobin A1c  should be confirmed by repeating the Hb A1c test.     • " Est Avg Glucose 11/04/2021 163  mg/dL Final    Comment: The eAG calculation is based on the A1c-Derived Daily Glucose  (ADAG) study.  See the ADA's website for additional information.     • Glucose - Accu-Ck 11/04/2021 156* 65 - 99 mg/dL Final   • Glucose - Accu-Ck 11/04/2021 178* 65 - 99 mg/dL Final    Notified provider   • Pathology Request 11/04/2021 Sent to Histo   Final   • Glucose - Accu-Ck 11/04/2021 145* 65 - 99 mg/dL Final   • WBC 11/05/2021 6.5  4.8 - 10.8 K/uL Final   • RBC 11/05/2021 4.73  4.70 - 6.10 M/uL Final   • Hemoglobin 11/05/2021 14.6  14.0 - 18.0 g/dL Final   • Hematocrit 11/05/2021 42.9  42.0 - 52.0 % Final   • MCV 11/05/2021 90.7  81.4 - 97.8 fL Final   • MCH 11/05/2021 30.9  27.0 - 33.0 pg Final   • MCHC 11/05/2021 34.0  33.7 - 35.3 g/dL Final   • RDW 11/05/2021 42.4  35.9 - 50.0 fL Final   • Platelet Count 11/05/2021 180  164 - 446 K/uL Final   • MPV 11/05/2021 8.5* 9.0 - 12.9 fL Final   • Sodium 11/05/2021 139  135 - 145 mmol/L Final   • Potassium 11/05/2021 4.0  3.6 - 5.5 mmol/L Final   • Chloride 11/05/2021 104  96 - 112 mmol/L Final   • Co2 11/05/2021 26  20 - 33 mmol/L Final   • Anion Gap 11/05/2021 9.0  7.0 - 16.0 Final   • Glucose 11/05/2021 150* 65 - 99 mg/dL Final   • Bun 11/05/2021 12  8 - 22 mg/dL Final   • Creatinine 11/05/2021 0.91  0.50 - 1.40 mg/dL Final   • Calcium 11/05/2021 9.1  8.5 - 10.5 mg/dL Final   • AST(SGOT) 11/05/2021 19  12 - 45 U/L Final   • ALT(SGPT) 11/05/2021 23  2 - 50 U/L Final   • Alkaline Phosphatase 11/05/2021 69  30 - 99 U/L Final   • Total Bilirubin 11/05/2021 0.6  0.1 - 1.5 mg/dL Final   • Albumin 11/05/2021 3.7  3.2 - 4.9 g/dL Final   • Total Protein 11/05/2021 6.3  6.0 - 8.2 g/dL Final   • Globulin 11/05/2021 2.6  1.9 - 3.5 g/dL Final   • A-G Ratio 11/05/2021 1.4  g/dL Final   • GFR If  11/05/2021 >60  >60 mL/min/1.73 m 2 Final   • GFR If Non  11/05/2021 >60  >60 mL/min/1.73 m 2 Final   • Glucose - Accu-Ck 11/05/2021  175* 65 - 99 mg/dL Final   • Glucose - Accu-Ck 11/05/2021 143* 65 - 99 mg/dL Final   • Glucose - Accu-Ck 11/05/2021 135* 65 - 99 mg/dL Final   • Glucose - Accu-Ck 11/05/2021 146* 65 - 99 mg/dL Final   • WBC 11/06/2021 7.0  4.8 - 10.8 K/uL Final   • RBC 11/06/2021 4.51* 4.70 - 6.10 M/uL Final   • Hemoglobin 11/06/2021 14.2  14.0 - 18.0 g/dL Final   • Hematocrit 11/06/2021 41.2* 42.0 - 52.0 % Final   • MCV 11/06/2021 91.4  81.4 - 97.8 fL Final   • MCH 11/06/2021 31.5  27.0 - 33.0 pg Final   • MCHC 11/06/2021 34.5  33.7 - 35.3 g/dL Final   • RDW 11/06/2021 41.6  35.9 - 50.0 fL Final   • Platelet Count 11/06/2021 179  164 - 446 K/uL Final   • MPV 11/06/2021 8.8* 9.0 - 12.9 fL Final   • Sodium 11/06/2021 138  135 - 145 mmol/L Final   • Potassium 11/06/2021 3.8  3.6 - 5.5 mmol/L Final   • Chloride 11/06/2021 100  96 - 112 mmol/L Final   • Co2 11/06/2021 26  20 - 33 mmol/L Final   • Anion Gap 11/06/2021 12.0  7.0 - 16.0 Final   • Glucose 11/06/2021 158* 65 - 99 mg/dL Final   • Bun 11/06/2021 14  8 - 22 mg/dL Final   • Creatinine 11/06/2021 0.69  0.50 - 1.40 mg/dL Final   • Calcium 11/06/2021 9.4  8.5 - 10.5 mg/dL Final   • AST(SGOT) 11/06/2021 13  12 - 45 U/L Final   • ALT(SGPT) 11/06/2021 17  2 - 50 U/L Final   • Alkaline Phosphatase 11/06/2021 69  30 - 99 U/L Final   • Total Bilirubin 11/06/2021 0.6  0.1 - 1.5 mg/dL Final   • Albumin 11/06/2021 3.9  3.2 - 4.9 g/dL Final   • Total Protein 11/06/2021 6.9  6.0 - 8.2 g/dL Final   • Globulin 11/06/2021 3.0  1.9 - 3.5 g/dL Final   • A-G Ratio 11/06/2021 1.3  g/dL Final   • GFR If  11/06/2021 >60  >60 mL/min/1.73 m 2 Final   • GFR If Non  11/06/2021 >60  >60 mL/min/1.73 m 2 Final   • Glucose - Accu-Ck 11/06/2021 153* 65 - 99 mg/dL Final   • WBC 11/07/2021 5.3  4.8 - 10.8 K/uL Final   • RBC 11/07/2021 4.93  4.70 - 6.10 M/uL Final   • Hemoglobin 11/07/2021 15.2  14.0 - 18.0 g/dL Final   • Hematocrit 11/07/2021 44.9  42.0 - 52.0 % Final   • MCV  11/07/2021 91.1  81.4 - 97.8 fL Final   • MCH 11/07/2021 30.8  27.0 - 33.0 pg Final   • MCHC 11/07/2021 33.9  33.7 - 35.3 g/dL Final   • RDW 11/07/2021 41.2  35.9 - 50.0 fL Final   • Platelet Count 11/07/2021 193  164 - 446 K/uL Final   • MPV 11/07/2021 8.6* 9.0 - 12.9 fL Final   • Sodium 11/07/2021 142  135 - 145 mmol/L Final   • Potassium 11/07/2021 4.0  3.6 - 5.5 mmol/L Final   • Chloride 11/07/2021 104  96 - 112 mmol/L Final   • Co2 11/07/2021 28  20 - 33 mmol/L Final   • Anion Gap 11/07/2021 10.0  7.0 - 16.0 Final   • Glucose 11/07/2021 171* 65 - 99 mg/dL Final   • Bun 11/07/2021 10  8 - 22 mg/dL Final   • Creatinine 11/07/2021 0.87  0.50 - 1.40 mg/dL Final   • Calcium 11/07/2021 9.7  8.5 - 10.5 mg/dL Final   • AST(SGOT) 11/07/2021 17  12 - 45 U/L Final   • ALT(SGPT) 11/07/2021 15  2 - 50 U/L Final   • Alkaline Phosphatase 11/07/2021 76  30 - 99 U/L Final   • Total Bilirubin 11/07/2021 0.6  0.1 - 1.5 mg/dL Final   • Albumin 11/07/2021 3.9  3.2 - 4.9 g/dL Final   • Total Protein 11/07/2021 7.3  6.0 - 8.2 g/dL Final   • Globulin 11/07/2021 3.4  1.9 - 3.5 g/dL Final   • A-G Ratio 11/07/2021 1.1  g/dL Final   • GFR If  11/07/2021 >60  >60 mL/min/1.73 m 2 Final   • GFR If Non  11/07/2021 >60  >60 mL/min/1.73 m 2 Final   • Glucose - Accu-Ck 11/07/2021 150* 65 - 99 mg/dL Final   • Glucose - Accu-Ck 11/07/2021 184* 65 - 99 mg/dL Final   • Glucose - Accu-Ck 11/07/2021 161* 65 - 99 mg/dL Final   • Glucose - Accu-Ck 11/07/2021 143* 65 - 99 mg/dL Final   • WBC 11/08/2021 4.3* 4.8 - 10.8 K/uL Final   • RBC 11/08/2021 4.36* 4.70 - 6.10 M/uL Final   • Hemoglobin 11/08/2021 13.3* 14.0 - 18.0 g/dL Final   • Hematocrit 11/08/2021 38.8* 42.0 - 52.0 % Final   • MCV 11/08/2021 89.0  81.4 - 97.8 fL Final   • MCH 11/08/2021 30.5  27.0 - 33.0 pg Final   • MCHC 11/08/2021 34.3  33.7 - 35.3 g/dL Final   • RDW 11/08/2021 40.7  35.9 - 50.0 fL Final   • Platelet Count 11/08/2021 204  164 - 446 K/uL Final   •  MPV 11/08/2021 8.7* 9.0 - 12.9 fL Final   • Sodium 11/08/2021 139  135 - 145 mmol/L Final   • Potassium 11/08/2021 3.5* 3.6 - 5.5 mmol/L Final   • Chloride 11/08/2021 102  96 - 112 mmol/L Final   • Co2 11/08/2021 27  20 - 33 mmol/L Final   • Anion Gap 11/08/2021 10.0  7.0 - 16.0 Final   • Glucose 11/08/2021 133* 65 - 99 mg/dL Final   • Bun 11/08/2021 9  8 - 22 mg/dL Final   • Creatinine 11/08/2021 0.77  0.50 - 1.40 mg/dL Final   • Calcium 11/08/2021 9.1  8.5 - 10.5 mg/dL Final   • AST(SGOT) 11/08/2021 13  12 - 45 U/L Final   • ALT(SGPT) 11/08/2021 13  2 - 50 U/L Final   • Alkaline Phosphatase 11/08/2021 62  30 - 99 U/L Final   • Total Bilirubin 11/08/2021 0.6  0.1 - 1.5 mg/dL Final   • Albumin 11/08/2021 3.5  3.2 - 4.9 g/dL Final   • Total Protein 11/08/2021 6.4  6.0 - 8.2 g/dL Final   • Globulin 11/08/2021 2.9  1.9 - 3.5 g/dL Final   • A-G Ratio 11/08/2021 1.2  g/dL Final   • Glucose - Accu-Ck 11/08/2021 136* 65 - 99 mg/dL Final   • GFR If  11/08/2021 >60  >60 mL/min/1.73 m 2 Final   • GFR If Non  11/08/2021 >60  >60 mL/min/1.73 m 2 Final   • Glucose - Accu-Ck 11/04/2021 143* 65 - 99 mg/dL Final   • Glucose - Accu-Ck 11/06/2021 156* 65 - 99 mg/dL Final   • Glucose - Accu-Ck 11/06/2021 156* 65 - 99 mg/dL Final   • Glucose - Accu-Ck 11/06/2021 170* 65 - 99 mg/dL Final   Pre-Admission Testing on 11/03/2021   Component Date Value Ref Range Status   • WBC 11/03/2021 4.6* 4.8 - 10.8 K/uL Final   • RBC 11/03/2021 4.85  4.70 - 6.10 M/uL Final   • Hemoglobin 11/03/2021 15.0  14.0 - 18.0 g/dL Final   • Hematocrit 11/03/2021 44.1  42.0 - 52.0 % Final   • MCV 11/03/2021 90.9  81.4 - 97.8 fL Final   • MCH 11/03/2021 30.9  27.0 - 33.0 pg Final   • MCHC 11/03/2021 34.0  33.7 - 35.3 g/dL Final   • RDW 11/03/2021 41.9  35.9 - 50.0 fL Final   • Platelet Count 11/03/2021 201  164 - 446 K/uL Final   • MPV 11/03/2021 8.9* 9.0 - 12.9 fL Final   • Neutrophils-Polys 11/03/2021 49.00  44.00 - 72.00 % Final    • Lymphocytes 2021 37.40  22.00 - 41.00 % Final   • Monocytes 2021 7.40  0.00 - 13.40 % Final   • Eosinophils 2021 5.30  0.00 - 6.90 % Final   • Basophils 2021 0.70  0.00 - 1.80 % Final   • Immature Granulocytes 2021 0.20  0.00 - 0.90 % Final   • Nucleated RBC 2021 0.00  /100 WBC Final   • Neutrophils (Absolute) 2021 2.24  1.82 - 7.42 K/uL Final    Includes immature neutrophils, if present.   • Lymphs (Absolute) 2021 1.71  1.00 - 4.80 K/uL Final   • Monos (Absolute) 2021 0.34  0.00 - 0.85 K/uL Final   • Eos (Absolute) 2021 0.24  0.00 - 0.51 K/uL Final   • Baso (Absolute) 2021 0.03  0.00 - 0.12 K/uL Final   • Immature Granulocytes (abs) 2021 0.01  0.00 - 0.11 K/uL Final   • NRBC (Absolute) 2021 0.00  K/uL Final   • PT 2021 12.3  12.0 - 14.6 sec Final   • INR 2021 0.94  0.87 - 1.13 Final    Comment: INR - Non-therapeutic Reference Range: 0.87-1.13  INR - Therapeutic Reference Range: 2.0-4.0     • Report 2021    Final                    Value:[facility]    Test Date:  2021  Pt Name:    NAZANIN COMBS          Department: [facility]  MRN:        [id number]                      Room:  Gender:     Male                         Technician: [facility] [place]  :        [id number]                   Requested By:MADINA GUERRA  Order #:    [id number]                    Reading MD: Ang Shen MD    Measurements  Intervals                                Axis  Rate:       60                           P:          60  VT:         184                          QRS:        20  QRSD:       88                           T:          31  QT:         392  QTc:        392    Interpretive Statements  SINUS RHYTHM  Compared to ECG 2021 09:47:42  No significant changes  Electronically Signed On 11-3-2021 14:36:18 PDT by Ang Shen MD     • SARS-CoV-2 Source 2021 Nasal Swab   Final   • SARS-COV ANTIGEN GREG 2021  NotDetected  Not-Detected Final    Comment: A result of Not-Detected is presumptive and should be confirmed with  a molecular assay, if necessary for patient management.    The Quidel Jo test has been authorized by FDA under an  Emergency Use Authorization (EUA).     • Sodium 11/03/2021 142  135 - 145 mmol/L Final   • Potassium 11/03/2021 4.1  3.6 - 5.5 mmol/L Final   • Chloride 11/03/2021 104  96 - 112 mmol/L Final   • Co2 11/03/2021 25  20 - 33 mmol/L Final   • Anion Gap 11/03/2021 13.0  7.0 - 16.0 Final   • Glucose 11/03/2021 137* 65 - 99 mg/dL Final   • Bun 11/03/2021 15  8 - 22 mg/dL Final   • Creatinine 11/03/2021 0.89  0.50 - 1.40 mg/dL Final   • Calcium 11/03/2021 9.7  8.5 - 10.5 mg/dL Final   • AST(SGOT) 11/03/2021 14  12 - 45 U/L Final   • ALT(SGPT) 11/03/2021 24  2 - 50 U/L Final   • Alkaline Phosphatase 11/03/2021 86  30 - 99 U/L Final   • Total Bilirubin 11/03/2021 0.4  0.1 - 1.5 mg/dL Final   • Albumin 11/03/2021 4.2  3.2 - 4.9 g/dL Final   • Total Protein 11/03/2021 7.2  6.0 - 8.2 g/dL Final   • Globulin 11/03/2021 3.0  1.9 - 3.5 g/dL Final   • A-G Ratio 11/03/2021 1.4  g/dL Final   • GFR If  11/03/2021 >60  >60 mL/min/1.73 m 2 Final   • GFR If Non  11/03/2021 >60  >60 mL/min/1.73 m 2 Final            Assessment/Plan:      1. COVID-19  Pt was exposed to COVID and had positive Ag test 2 d ago.     Pt has DM and is at high risk for progression to severe illness.     Rx given for Paxlovid and pt instructed to  medication from [facility].       Risks and benefits explained and pt wishes to proceed.        Kidney function was normal as of 11/2021    - Nirmatrelvir & Ritonavir 20 x 150 MG & 10 x 100MG Tablet Therapy Pack; Take 300 mg nirmatrelvir (two 150 mg tablets) with 100 mg  ritonavir (one 100 mg tablet) by mouth, with all three tablets taken together  twice daily for 5 days.  Dispense: 30 Each; Refill: 0    My total time spent caring for the patient on  the day of the encounter was 45 minutes.   This does not include time spent on separately billable procedures/tests.

## 2022-01-24 DIAGNOSIS — U07.1 COVID-19: ICD-10-CM

## 2022-01-25 LAB
FLUAV RNA SPEC QL NAA+PROBE: NEGATIVE
FLUBV RNA SPEC QL NAA+PROBE: NEGATIVE
SARS-COV-2 RNA RESP QL NAA+PROBE: DETECTED
SPECIMEN SOURCE: ABNORMAL

## 2022-05-02 ENCOUNTER — HOSPITAL ENCOUNTER (EMERGENCY)
Facility: MEDICAL CENTER | Age: 67
End: 2022-05-03
Attending: EMERGENCY MEDICINE
Payer: MEDICARE

## 2022-05-02 ENCOUNTER — APPOINTMENT (OUTPATIENT)
Dept: RADIOLOGY | Facility: MEDICAL CENTER | Age: 67
End: 2022-05-02
Attending: EMERGENCY MEDICINE
Payer: MEDICARE

## 2022-05-02 DIAGNOSIS — R19.7 DIARRHEA, UNSPECIFIED TYPE: ICD-10-CM

## 2022-05-02 DIAGNOSIS — R10.31 RLQ ABDOMINAL PAIN: ICD-10-CM

## 2022-05-02 DIAGNOSIS — K52.9 COLITIS: ICD-10-CM

## 2022-05-02 LAB
ALBUMIN SERPL BCP-MCNC: 3.9 G/DL (ref 3.2–4.9)
ALBUMIN/GLOB SERPL: 1.2 G/DL
ALP SERPL-CCNC: 101 U/L (ref 30–99)
ALT SERPL-CCNC: 15 U/L (ref 2–50)
ANION GAP SERPL CALC-SCNC: 13 MMOL/L (ref 7–16)
APPEARANCE UR: CLEAR
AST SERPL-CCNC: 14 U/L (ref 12–45)
BACTERIA #/AREA URNS HPF: NEGATIVE /HPF
BASOPHILS # BLD AUTO: 0.3 % (ref 0–1.8)
BASOPHILS # BLD: 0.02 K/UL (ref 0–0.12)
BILIRUB SERPL-MCNC: 0.3 MG/DL (ref 0.1–1.5)
BILIRUB UR QL STRIP.AUTO: NEGATIVE
BUN SERPL-MCNC: 20 MG/DL (ref 8–22)
CALCIUM SERPL-MCNC: 9.5 MG/DL (ref 8.4–10.2)
CHLORIDE SERPL-SCNC: 101 MMOL/L (ref 96–112)
CO2 SERPL-SCNC: 22 MMOL/L (ref 20–33)
COLOR UR: YELLOW
CREAT SERPL-MCNC: 1.07 MG/DL (ref 0.5–1.4)
EOSINOPHIL # BLD AUTO: 0.04 K/UL (ref 0–0.51)
EOSINOPHIL NFR BLD: 0.6 % (ref 0–6.9)
EPI CELLS #/AREA URNS HPF: NEGATIVE /HPF
ERYTHROCYTE [DISTWIDTH] IN BLOOD BY AUTOMATED COUNT: 43.7 FL (ref 35.9–50)
GFR SERPLBLD CREATININE-BSD FMLA CKD-EPI: 75 ML/MIN/1.73 M 2
GLOBULIN SER CALC-MCNC: 3.2 G/DL (ref 1.9–3.5)
GLUCOSE SERPL-MCNC: 228 MG/DL (ref 65–99)
GLUCOSE UR STRIP.AUTO-MCNC: >=1000 MG/DL
HCT VFR BLD AUTO: 43.7 % (ref 42–52)
HGB BLD-MCNC: 15 G/DL (ref 14–18)
IMM GRANULOCYTES # BLD AUTO: 0.02 K/UL (ref 0–0.11)
IMM GRANULOCYTES NFR BLD AUTO: 0.3 % (ref 0–0.9)
KETONES UR STRIP.AUTO-MCNC: ABNORMAL MG/DL
LEUKOCYTE ESTERASE UR QL STRIP.AUTO: NEGATIVE
LIPASE SERPL-CCNC: 34 U/L (ref 7–58)
LYMPHOCYTES # BLD AUTO: 1.06 K/UL (ref 1–4.8)
LYMPHOCYTES NFR BLD: 17 % (ref 22–41)
MCH RBC QN AUTO: 31.6 PG (ref 27–33)
MCHC RBC AUTO-ENTMCNC: 34.3 G/DL (ref 33.7–35.3)
MCV RBC AUTO: 92.2 FL (ref 81.4–97.8)
MICRO URNS: ABNORMAL
MONOCYTES # BLD AUTO: 0.59 K/UL (ref 0–0.85)
MONOCYTES NFR BLD AUTO: 9.5 % (ref 0–13.4)
MUCOUS THREADS #/AREA URNS HPF: ABNORMAL /HPF
NEUTROPHILS # BLD AUTO: 4.5 K/UL (ref 1.82–7.42)
NEUTROPHILS NFR BLD: 72.3 % (ref 44–72)
NITRITE UR QL STRIP.AUTO: NEGATIVE
NRBC # BLD AUTO: 0 K/UL
NRBC BLD-RTO: 0 /100 WBC
PH UR STRIP.AUTO: 5.5 [PH] (ref 5–8)
PLATELET # BLD AUTO: 152 K/UL (ref 164–446)
PMV BLD AUTO: 8.8 FL (ref 9–12.9)
POTASSIUM SERPL-SCNC: 3.7 MMOL/L (ref 3.6–5.5)
PROT SERPL-MCNC: 7.1 G/DL (ref 6–8.2)
PROT UR QL STRIP: NEGATIVE MG/DL
RBC # BLD AUTO: 4.74 M/UL (ref 4.7–6.1)
RBC # URNS HPF: ABNORMAL /HPF
RBC UR QL AUTO: ABNORMAL
SODIUM SERPL-SCNC: 136 MMOL/L (ref 135–145)
SP GR UR STRIP.AUTO: 1.01
WBC # BLD AUTO: 6.2 K/UL (ref 4.8–10.8)
WBC #/AREA URNS HPF: ABNORMAL /HPF

## 2022-05-02 PROCEDURE — 700111 HCHG RX REV CODE 636 W/ 250 OVERRIDE (IP): Performed by: EMERGENCY MEDICINE

## 2022-05-02 PROCEDURE — 83690 ASSAY OF LIPASE: CPT

## 2022-05-02 PROCEDURE — 36415 COLL VENOUS BLD VENIPUNCTURE: CPT

## 2022-05-02 PROCEDURE — 80053 COMPREHEN METABOLIC PANEL: CPT

## 2022-05-02 PROCEDURE — 96374 THER/PROPH/DIAG INJ IV PUSH: CPT

## 2022-05-02 PROCEDURE — 700105 HCHG RX REV CODE 258: Performed by: EMERGENCY MEDICINE

## 2022-05-02 PROCEDURE — 81001 URINALYSIS AUTO W/SCOPE: CPT

## 2022-05-02 PROCEDURE — 99285 EMERGENCY DEPT VISIT HI MDM: CPT

## 2022-05-02 PROCEDURE — 85025 COMPLETE CBC W/AUTO DIFF WBC: CPT

## 2022-05-02 RX ORDER — SODIUM CHLORIDE, SODIUM LACTATE, POTASSIUM CHLORIDE, CALCIUM CHLORIDE 600; 310; 30; 20 MG/100ML; MG/100ML; MG/100ML; MG/100ML
1000 INJECTION, SOLUTION INTRAVENOUS ONCE
Status: COMPLETED | OUTPATIENT
Start: 2022-05-02 | End: 2022-05-03

## 2022-05-02 RX ORDER — MORPHINE SULFATE 4 MG/ML
4 INJECTION INTRAVENOUS ONCE
Status: COMPLETED | OUTPATIENT
Start: 2022-05-02 | End: 2022-05-02

## 2022-05-02 RX ADMIN — SODIUM CHLORIDE, POTASSIUM CHLORIDE, SODIUM LACTATE AND CALCIUM CHLORIDE 1000 ML: 600; 310; 30; 20 INJECTION, SOLUTION INTRAVENOUS at 22:37

## 2022-05-02 RX ADMIN — MORPHINE SULFATE 4 MG: 4 INJECTION INTRAVENOUS at 22:45

## 2022-05-02 ASSESSMENT — FIBROSIS 4 INDEX: FIB4 SCORE: 1.201850425154663098

## 2022-05-03 VITALS
HEART RATE: 80 BPM | TEMPERATURE: 98.3 F | WEIGHT: 175.49 LBS | OXYGEN SATURATION: 93 % | HEIGHT: 65 IN | DIASTOLIC BLOOD PRESSURE: 72 MMHG | RESPIRATION RATE: 18 BRPM | SYSTOLIC BLOOD PRESSURE: 131 MMHG | BODY MASS INDEX: 29.24 KG/M2

## 2022-05-03 PROCEDURE — A9270 NON-COVERED ITEM OR SERVICE: HCPCS | Performed by: EMERGENCY MEDICINE

## 2022-05-03 PROCEDURE — 74177 CT ABD & PELVIS W/CONTRAST: CPT | Mod: ME

## 2022-05-03 PROCEDURE — 700102 HCHG RX REV CODE 250 W/ 637 OVERRIDE(OP): Performed by: EMERGENCY MEDICINE

## 2022-05-03 PROCEDURE — 700111 HCHG RX REV CODE 636 W/ 250 OVERRIDE (IP): Performed by: EMERGENCY MEDICINE

## 2022-05-03 PROCEDURE — 96376 TX/PRO/DX INJ SAME DRUG ADON: CPT

## 2022-05-03 PROCEDURE — 700117 HCHG RX CONTRAST REV CODE 255: Performed by: EMERGENCY MEDICINE

## 2022-05-03 RX ORDER — HYDROCODONE BITARTRATE AND ACETAMINOPHEN 5; 325 MG/1; MG/1
1 TABLET ORAL ONCE
Status: COMPLETED | OUTPATIENT
Start: 2022-05-03 | End: 2022-05-03

## 2022-05-03 RX ORDER — HYDROCODONE BITARTRATE AND ACETAMINOPHEN 5; 325 MG/1; MG/1
1 TABLET ORAL EVERY 6 HOURS PRN
Qty: 12 TABLET | Refills: 0 | Status: SHIPPED | OUTPATIENT
Start: 2022-05-03 | End: 2022-05-07

## 2022-05-03 RX ORDER — LEVOFLOXACIN 500 MG/1
500 TABLET, FILM COATED ORAL DAILY
Qty: 5 TABLET | Refills: 0 | Status: SHIPPED | OUTPATIENT
Start: 2022-05-03 | End: 2022-05-08

## 2022-05-03 RX ORDER — MORPHINE SULFATE 4 MG/ML
4 INJECTION INTRAVENOUS ONCE
Status: COMPLETED | OUTPATIENT
Start: 2022-05-03 | End: 2022-05-03

## 2022-05-03 RX ORDER — METRONIDAZOLE 500 MG/1
500 TABLET ORAL 3 TIMES DAILY
Qty: 15 TABLET | Refills: 0 | Status: SHIPPED | OUTPATIENT
Start: 2022-05-03

## 2022-05-03 RX ADMIN — HYDROCODONE BITARTRATE AND ACETAMINOPHEN 1 TABLET: 5; 325 TABLET ORAL at 01:13

## 2022-05-03 RX ADMIN — IOHEXOL 100 ML: 350 INJECTION, SOLUTION INTRAVENOUS at 00:28

## 2022-05-03 RX ADMIN — MORPHINE SULFATE 4 MG: 4 INJECTION INTRAVENOUS at 01:14

## 2022-05-03 RX ADMIN — IOHEXOL 25 ML: 240 INJECTION, SOLUTION INTRATHECAL; INTRAVASCULAR; INTRAVENOUS; ORAL at 00:28

## 2022-05-03 NOTE — DISCHARGE INSTRUCTIONS
Return the emergency department if you have increasing pain, blood in vomit, fever or blood in stool.

## 2022-05-03 NOTE — ED PROVIDER NOTES
ED Provider Note    Scribed for Trace Key M.D. by Shiloh Benito. 5/2/2022, 9:51 PM.    Primary care provider: Yan Powell D.O.  Means of arrival: Walk in  History obtained from: patient   History limited by: none    CHIEF COMPLAINT  Chief Complaint   Patient presents with    Abdominal Pain     Entire abd x 3 d  No N/V    Diarrhea     Liquid stool x >10/24 hrs    Fever     Not sure how high  Pt is fully vaccinated for Covid       HPI  Kenyon Hewitt is a 66 y.o. male who presents to the Emergency Department for abdominal pain onset  2-3 days ago. Patient describes his abdominal pain as sharp stabbing and located it most to the right side of his abdomen. He has associated diarrhea, fever, dysuria, and bilateral testicular pain but denies sore throat or vomiting. Denies history of appendectomy. Patient has a past medical history of cancer but is not undergoing treatment at this time.    REVIEW OF SYSTEMS  Pertinent positives include abdominal pain, diarrhea, fever, testicular pain, and dysuria. Pertinent negatives include sore throat or vomiting. All other systems negative.    PAST MEDICAL HISTORY   has a past medical history of Backpain, Cancer (HCC) (2021), Dental disorder, Diabetes, Diverticulosis, Glaucoma, Heart burn, High cholesterol, Hyperlipidemia, Hypertension, Pain (11/03/2021), Parkinson's syndrome (HCC), Psychiatric problem, Sleep apnea, Snoring, Stroke (HCC) (2014), Type II or unspecified type diabetes mellitus without mention of complication, uncontrolled (5/18/2010), and Vitiligo.    SURGICAL HISTORY   has a past surgical history that includes excision/destruction open abdominal tumors >* (N/A, 11/4/2021); exploratory of abdomen (11/4/2021); ultrasonic guidance, intraoperative (11/4/2021); and removal of omentum (11/4/2021).    SOCIAL HISTORY  Social History     Tobacco Use    Smoking status: Former Smoker     Years: 16.00     Types: Cigarettes     Quit date: 1/1/1990     Years  "since quittin.3    Smokeless tobacco: Never Used   Vaping Use    Vaping Use: Never used   Substance Use Topics    Alcohol use: No    Drug use: No      Social History     Substance and Sexual Activity   Drug Use No       FAMILY HISTORY  Family History   Problem Relation Age of Onset    Diabetes Mother     Hypertension Mother     Psychiatric Illness Father     Diabetes Sister     Diabetes Brother        CURRENT MEDICATIONS  Current Outpatient Medications   Medication Instructions    acetaminophen (TYLENOL) 1,000 mg, Oral, EVERY 8 HOURS    Acetaminophen Extra Strength 500-1,000 mg, Oral, EVERY 6 HOURS PRN    atorvastatin (LIPITOR) 40 mg, Oral, NIGHTLY    busPIRone (BUSPAR) 10 mg, Oral, 3 TIMES DAILY PRN    finasteride (PROSCAR) 5 mg, Oral, DAILY, FOR 90 DAYS    GAVILYTE-G 236 g Recon Soln 4 L, Oral, ONCE    lisinopril (PRINIVIL) 5 mg, Oral, DAILY    Nirmatrelvir & Ritonavir 20 x 150 MG & 10 x 100MG Tablet Therapy Pack Take 300 mg nirmatrelvir (two 150 mg tablets) with 100 mg<BR>ritonavir (one 100 mg tablet) by mouth, with all three tablets taken together<BR>twice daily for 5 days.    SYNJARDY 12.5-1000 MG Tab 1 Tablet, Oral, 2 TIMES DAILY       ALLERGIES  Allergies   Allergen Reactions    Nkda [No Known Drug Allergy]        PHYSICAL EXAM  VITAL SIGNS: /74   Pulse (!) 110   Temp 37.7 °C (99.8 °F) (Temporal)   Resp 16   Ht 1.651 m (5' 5\")   Wt 79.6 kg (175 lb 7.8 oz)   SpO2 90%   BMI 29.20 kg/m²     Constitutional: Well developed, Well nourished, no distress.   HENT: Normocephalic, Atraumatic, mask in place.  Eyes: Conjunctiva normal, No discharge.   Neck: Supple, No stridor  Cardiovascular: Normal heart rate, Normal rhythm, No murmurs, equal pulses.   Pulmonary: Normal breath sounds, No respiratory distress, No wheezing, No rales, No rhonchi.  Chest: No chest wall tenderness or deformity.   Abdomen:Soft,  Tenderness over Mcburneys point with guarding, no testicular tenderness, No masses, no rebound. "   Back: No CVA tenderness.   Musculoskeletal: No major deformities noted, No tenderness.   Skin: Warm, Dry, No erythema, No rash.   Neurologic: Alert & oriented x 3, Normal motor function,  No focal deficits noted.   Psychiatric: Affect normal, Judgment normal, Mood normal.     LABS  Results for orders placed or performed during the hospital encounter of 05/02/22   CBC WITH DIFFERENTIAL   Result Value Ref Range    WBC 6.2 4.8 - 10.8 K/uL    RBC 4.74 4.70 - 6.10 M/uL    Hemoglobin 15.0 14.0 - 18.0 g/dL    Hematocrit 43.7 42.0 - 52.0 %    MCV 92.2 81.4 - 97.8 fL    MCH 31.6 27.0 - 33.0 pg    MCHC 34.3 33.7 - 35.3 g/dL    RDW 43.7 35.9 - 50.0 fL    Platelet Count 152 (L) 164 - 446 K/uL    MPV 8.8 (L) 9.0 - 12.9 fL    Neutrophils-Polys 72.30 (H) 44.00 - 72.00 %    Lymphocytes 17.00 (L) 22.00 - 41.00 %    Monocytes 9.50 0.00 - 13.40 %    Eosinophils 0.60 0.00 - 6.90 %    Basophils 0.30 0.00 - 1.80 %    Immature Granulocytes 0.30 0.00 - 0.90 %    Nucleated RBC 0.00 /100 WBC    Neutrophils (Absolute) 4.50 1.82 - 7.42 K/uL    Lymphs (Absolute) 1.06 1.00 - 4.80 K/uL    Monos (Absolute) 0.59 0.00 - 0.85 K/uL    Eos (Absolute) 0.04 0.00 - 0.51 K/uL    Baso (Absolute) 0.02 0.00 - 0.12 K/uL    Immature Granulocytes (abs) 0.02 0.00 - 0.11 K/uL    NRBC (Absolute) 0.00 K/uL   COMP METABOLIC PANEL   Result Value Ref Range    Sodium 136 135 - 145 mmol/L    Potassium 3.7 3.6 - 5.5 mmol/L    Chloride 101 96 - 112 mmol/L    Co2 22 20 - 33 mmol/L    Anion Gap 13.0 7.0 - 16.0    Glucose 228 (H) 65 - 99 mg/dL    Bun 20 8 - 22 mg/dL    Creatinine 1.07 0.50 - 1.40 mg/dL    Calcium 9.5 8.4 - 10.2 mg/dL    AST(SGOT) 14 12 - 45 U/L    ALT(SGPT) 15 2 - 50 U/L    Alkaline Phosphatase 101 (H) 30 - 99 U/L    Total Bilirubin 0.3 0.1 - 1.5 mg/dL    Albumin 3.9 3.2 - 4.9 g/dL    Total Protein 7.1 6.0 - 8.2 g/dL    Globulin 3.2 1.9 - 3.5 g/dL    A-G Ratio 1.2 g/dL   LIPASE   Result Value Ref Range    Lipase 34 7 - 58 U/L   URINALYSIS    Specimen:  Urine, Clean Catch; Blood   Result Value Ref Range    Color Yellow     Character Clear     Specific Gravity 1.010 <1.035    Ph 5.5 5.0 - 8.0    Glucose >=1000 (A) Negative mg/dL    Ketones Trace (A) Negative mg/dL    Protein Negative Negative mg/dL    Bilirubin Negative Negative    Nitrite Negative Negative    Leukocyte Esterase Negative Negative    Occult Blood Trace (A) Negative    Micro Urine Req Microscopic    URINE MICROSCOPIC (W/UA)   Result Value Ref Range    WBC 0-2 (A) /hpf    RBC 0-2 (A) /hpf    Bacteria Negative None /hpf    Epithelial Cells Negative Few /hpf    Mucous Threads Rare /hpf   ESTIMATED GFR   Result Value Ref Range    GFR (CKD-EPI) 75 >60 mL/min/1.73 m 2     All labs reviewed by me.    RADIOLOGY  CT-ABDOMEN-PELVIS WITH   Final Result      1.  Diffuse colonic wall thickening consistent with colitis, likely inflammatory or infectious.   2.  Normal appendix.        The radiologist's interpretation of all radiological studies have been reviewed by me.    COURSE & MEDICAL DECISION MAKING  Pertinent Labs & Imaging studies reviewed. (See chart for details)    9:51 PM - Patient seen and examined at bedside. Labs ordered in triage including CBC w/ diff, CMP, lipase and UA. I informed the patient that his labs are all reassuring. He is tender over the area of his appendix. We will obtain a CT. Patient will be treated with morphine for pain.. The patient will receive IV fluids for diarrhea. Ordered CT abdomen to evaluate his symptoms. The differential diagnoses include but are not limited to: Acute appendicitis, diverticulitis, hernia, colitis, tumor    Patient's heart rate improved with IV fluids      Medications   morphine 4 MG/ML injection 4 mg (4 mg Intravenous Given 5/2/22 2245)   LR infusion (bolus) (0 mL Intravenous Stopped 5/3/22 0007)   iohexol (OMNIPAQUE) 240 mg/mL (Oral) (25 mL Oral Given 5/3/22 0028)   iohexol (OMNIPAQUE) 350 mg/mL (IV) (100 mL Intravenous Given 5/3/22 0028)   morphine 4  MG/ML injection 4 mg (4 mg Intravenous Given 5/3/22 0114)   HYDROcodone-acetaminophen (NORCO) 5-325 MG per tablet 1 Tablet (1 Tablet Oral Given 5/3/22 0113)         Medical Decision Making: At this point time I think the patient's pain in the right lower quadrant secondary to colitis likely causing the diarrhea.  Patient will be treated with Flagyl and Levaquin.  CT was done because I was concerned about possible acute appendicitis and appendix is normal.  Patient's labs are otherwise reassuring.  Patient denies any previous history of C. difficile or any recent antibiotic use.      I reviewed prescription monitoring program for patient's narcotic use before prescribing a scheduled drug.The patient will not drink alcohol nor drive with prescribed medications. The patient will return for new or worsening symptoms and is stable at the time of discharge.    The patient is referred to a primary physician for blood pressure management, diabetic screening, and for all other preventative health concerns.    In prescribing controlled substances to this patient, I certify that I have obtained and reviewed the medical history of Kenyon Hewitt. I have also made a good masha effort to obtain applicable records from other providers who have treated the patient and records did not demonstrate any increased risk of substance abuse that would prevent me from prescribing controlled substances.     I have conducted a physical exam and documented it. I have reviewed Mr. Mayra Hewitt’s prescription history as maintained by the [place] Prescription Monitoring Program.     I have assessed the patient’s risk for abuse, dependency, and addiction using the validated Opioid Risk Tool available at https://www.mdcalc.com/nisddh-osxz-jwfm-ort-narcotic-abuse.     Given the above, I believe the benefits of controlled substance therapy outweigh the risks. The reasons for prescribing controlled substances include non-narcotic, oral  analgesic alternatives have been inadequate for pain control. Accordingly, I have discussed the risk and benefits, treatment plan, and alternative therapies with the patient.       DISPOSITION:  Patient will be discharged home in stable condition.    FOLLOW UP:  Yan Powell D.O.  [address]  [facility] [address]  [place] [address]  [phone]    Schedule an appointment as soon as possible for a visit in 3 days        OUTPATIENT MEDICATIONS:  New Prescriptions    HYDROCODONE-ACETAMINOPHEN (NORCO) 5-325 MG TAB PER TABLET    Take 1 Tablet by mouth every 6 hours as needed (Severe pain) for up to 4 days.    LEVOFLOXACIN (LEVAQUIN) 500 MG TABLET    Take 1 Tablet by mouth every day for 5 days.    METRONIDAZOLE (FLAGYL) 500 MG TAB    Take 1 Tablet by mouth 3 times a day.         FINAL IMPRESSION  1. RLQ abdominal pain    2. Diarrhea, unspecified type    3. Colitis          Shiloh STONE (Scribe), am scribing for, and in the presence of, Trace Key M.D.    Electronically signed by: Shiloh Benito (Adiliaibnichole), 5/2/2022    Trace STONE M.D. personally performed the services described in this documentation, as scribed by Shiloh Benito in my presence, and it is both accurate and complete.    The note accurately reflects work and decisions made by me.  Trace Key M.D.  5/3/2022  1:26 AM

## 2022-05-03 NOTE — ED NOTES
D/C to home w/ dtr.  VSS.   flush 0.9 % injection 10 mL, PRN  magnesium hydroxide (MILK OF MAGNESIA) 400 MG/5ML suspension 30 mL, Daily PRN  ondansetron (ZOFRAN) injection 4 mg, Q6H PRN  acetaminophen (TYLENOL) tablet 760 mg, Q7I PRN  folic acid (FOLVITE) tablet 1 mg, Daily  multivitamin 1 tablet, Daily        Objective:  /85   Pulse 87   Temp 98.2 °F (36.8 °C) (Temporal)   Resp 17   Ht 5' 11\" (1.803 m)   Wt 174 lb 13.2 oz (79.3 kg)   SpO2 96%   BMI 24.38 kg/m²     Intake/Output Summary (Last 24 hours) at 1/15/2020 1317  Last data filed at 1/15/2020 1100  Gross per 24 hour   Intake 846 ml   Output 1275 ml   Net -429 ml      Wt Readings from Last 3 Encounters:   01/15/20 174 lb 13.2 oz (79.3 kg)   11/20/19 182 lb (82.6 kg)   10/22/19 185 lb (83.9 kg)       General appearance:  Appears comfortable  Eyes: Sclera clear. Pupils equal.  ENT: Moist oral mucosa. Trachea midline, no adenopathy. Cardiovascular: Regular rhythm, normal S1, S2. No murmur. No edema in lower extremities  Respiratory: Not using accessory muscles. Good inspiratory effort. Clear to auscultation bilaterally, no wheeze or crackles. GI: Abdomen soft, no tenderness, not distended, normal bowel sounds  Musculoskeletal: No cyanosis in digits, neck supple  Neurology: CN 2-12 grossly intact. No speech or motor deficits  Psych: Normal affect.  Alert and oriented in time, place and person  Skin: Warm, dry, normal turgor    Labs and Tests:  CBC:   Recent Labs     01/13/20 1858 01/14/20  0510   WBC 7.3 5.0   HGB 16.4 14.2   PLT 85* 57*     BMP:    Recent Labs     01/13/20 1858 01/14/20  0510 01/15/20  0447    138 141   K 3.9 3.3* 3.7   CL 96* 101 103   CO2 14* 24 25   BUN 6* 5* 4*   CREATININE 0.7* 0.7* 0.7*   GLUCOSE 119* 95 86     Hepatic:   Recent Labs     01/14/20  0510   AST 80*   ALT 54*   BILITOT 1.4*   ALKPHOS 58       Discussed care with family and patient             Spent 30  minutes with patient and family at bedside and on unit reviewing medical records and labs, spent greater than 50% time counseling patient and family on diagnosis and plan   Problem List  Principal Problem:    Seizure disorder [facility])  Active Problems: Thrombocytopenia (HCC)    High anion gap metabolic acidosis    Lactic acidosis    Alcohol abuse    Acute encephalopathy    Partial idiopathic epilepsy with seizures of localized onset, not intractable, without status epilepticus ([facility] [address]    HTN (hypertension), benign  Resolved Problems:    * No resolved hospital problems. *       Assessment & Plan:   1.  Acute alcohol withdrawal  -Continue Precedex drip apparently was stopped last night we will continue  -Continue schedule Librium  -Phenobarb protocol per ICU  -Encourage patient to take Librium as he has refused  -Monito    Seizure  -Likely alcohol withdrawal none since admission monitor closely  -      Diet: DIET GENERAL;  Code:Full Code  DVT PPX lovenox       Brad Hinojosa MD   1/15/2020 1:17 PM

## 2022-05-03 NOTE — ED NOTES
Patient transferred to CT Scan via [facility].     Brief report to Nelly RAMSAY

## 2022-05-03 NOTE — ED NOTES
Assumed patient care and report from Lor Brown at 2300    Patient drinking and tolerating the po contrast.

## 2022-05-13 ENCOUNTER — HOSPITAL ENCOUNTER (OUTPATIENT)
Dept: RADIOLOGY | Facility: MEDICAL CENTER | Age: 67
End: 2022-05-13
Attending: SURGERY
Payer: MEDICARE

## 2022-05-13 DIAGNOSIS — C49.5: ICD-10-CM

## 2022-05-13 DIAGNOSIS — G89.3 PAIN DUE TO NEOPLASM: ICD-10-CM

## 2022-05-13 PROCEDURE — 700117 HCHG RX CONTRAST REV CODE 255: Performed by: SURGERY

## 2022-05-13 PROCEDURE — 71260 CT THORAX DX C+: CPT | Mod: MC

## 2022-05-13 RX ADMIN — IOHEXOL 100 ML: 350 INJECTION, SOLUTION INTRAVENOUS at 08:45

## 2022-05-18 NOTE — PROGRESS NOTES
Subjective:      Primary care physician: Yan Powell D.O.  Referring Provider: Luis Cantu MD   Medical Oncologist: Luis Cantu MD     Chief Complaint: No chief complaint on file.    Diagnosis:   1. Rhabdomyosarcoma of pelvis (HCC)     2. Abnormal CT of the abdomen     3. Pain due to neoplasm     4. S/P exploratory laparotomy         History of presenting illness:    Kenyon Hewitt is a pleasant 66 year old male who presented with follow-up status post resection of a pseudotumor in his abdomen.  The patient was diagnosed with a mass in the abdomen was painful.  He was biopsied and came back as potentially rhabdomyosarcoma which was somewhat unusual for a 66-year-old with an intraperitoneal tumor.  During resection we found a bunch of matted omentum attached to the lateral sidewall.  There was no obvious tumor outside of that.  The omentum was resected along with the peritoneum and we explored the lateral pelvic wall and inferior pelvic wall with no sign of tumor masses we ran the small bowel as well.  The pathology from this came back as fibrotic tissue with plasma cells and peripheral atypical stromal cells.  They considered it a pseudotumor due to the inflammatory changes.  He does well.  He denies any fever chills, nausea or vomiting.  Staples are out and the incision looks great.  He did run out of tramadol so I am sending another prescription for 2 more weeks.  He is here with his wife to discuss next steps using the official  iPad.    Update 5/24/2022    CT ABDOMEN / PELVIS on 5/3/22 and 5/13/22 notes no evidence of local recurrence. No metastatic disease in the chest, abdomen or pelvis. Diffuse colonic wall thickening consistent with colitis.    He is here today for polyp surveillance after having a resection which did come back as a fibrous tumor but not a malignancy.  The patient's CT scan which I have personally reviewed shows no sign of recurrent disease or metastatic disease  in the chest abdomen pelvis.  The patient has been doing well.  He denies any fever or chills, nausea or vomiting.  No hernia has no abdominal pain and he is doing well his ECOG performance status is 0.  I did review the patient's pathology report as well as his previous imaging.    Past Medical History:   Diagnosis Date   • Backpain    • Cancer ([facility]) 2021    Sarcoma   • Dental disorder     Upper and lower dentures   • Diabetes     Oral medications   • Diverticulosis    • Glaucoma     Bilateral   • Heart burn    • High cholesterol    • Hyperlipidemia    • Hypertension    • Pain 11/03/2021    Lower abdomen   • Parkinson's syndrome ([facility])    • Psychiatric problem     Anxiety   • Sleep apnea     Does not wear CPAP; no O2   • Snoring    • Stroke ([facility]) 2014    Residual weakness on Right side   • Type II or unspecified type diabetes mellitus without mention of complication, uncontrolled 5/18/2010   • Vitiligo      Past Surgical History:   Procedure Laterality Date   • SD EXCISION/DESTRUCTION OPEN ABDOMINAL TUMORS >* N/A 11/4/2021    Procedure: EXCISION, RETROPERITONEUM - TUMOR;  Surgeon: Tyrone Bain M.D.;  Location: SURGERY [facility];  Service: General   • SD EXPLORATORY OF ABDOMEN  11/4/2021    Procedure: LAPAROTOMY, EXPLORATORY;  Surgeon: Tyrone Bain M.D.;  Location: SURGERY [facility];  Service: General   • SD ULTRASONIC GUIDANCE, INTRAOPERATIVE  11/4/2021    Procedure: INTRA OPERATIVE ULTRASOUND GUIDANCE;  Surgeon: Tyrone Bain M.D.;  Location: SURGERY [facility];  Service: General   • SD REMOVAL OF OMENTUM  11/4/2021    Procedure: OMENTECTOMY AND OMENTAL BIOPSY;  Surgeon: Tyrone Bain M.D.;  Location: SURGERY [facility];  Service: General     Allergies   Allergen Reactions   • Nkda [No Known Drug Allergy]      Outpatient Encounter Medications as of 5/24/2022   Medication Sig Dispense Refill   • metroNIDAZOLE (FLAGYL) 500 MG Tab Take 1 Tablet by mouth 3 times a  day. 15 Tablet 0   • finasteride (PROSCAR) 5 MG Tab Take 5 mg by mouth every day. FOR 90 DAYS     • Nirmatrelvir & Ritonavir 20 x 150 MG & 10 x 100MG Tablet Therapy Pack Take 300 mg nirmatrelvir (two 150 mg tablets) with 100 mg  ritonavir (one 100 mg tablet) by mouth, with all three tablets taken together  twice daily for 5 days. 30 Each 0   • acetaminophen (TYLENOL) 500 MG Tab Take 2 Tablets by mouth every 8 hours. 30 Tablet 0   • acetaminophen (TYLENOL) 500 MG Tab Take 1-2 Tablets by mouth every 6 hours as needed. 30 Tablet 0   • GAVILYTE-G 236 g Recon Soln Take 4 L by mouth one time.     • SYNJARDY 12.5-1000 MG Tab Take 1 Tablet by mouth 2 times a day.     • atorvastatin (LIPITOR) 40 MG Tab Take 40 mg by mouth every evening.     • busPIRone (BUSPAR) 10 MG Tab Take 10 mg by mouth 3 times a day as needed (For anxiety).     • lisinopril (PRINIVIL) 5 MG Tab Take 5 mg by mouth every day.       No facility-administered encounter medications on file as of 2022.     Social History     Socioeconomic History   • Marital status:      Spouse name: Not on file   • Number of children: Not on file   • Years of education: Not on file   • Highest education level: Not on file   Occupational History   • Not on file   Tobacco Use   • Smoking status: Former Smoker     Years: 16.00     Types: Cigarettes     Quit date: 1990     Years since quittin.3   • Smokeless tobacco: Never Used   Vaping Use   • Vaping Use: Never used   Substance and Sexual Activity   • Alcohol use: No   • Drug use: No   • Sexual activity: Not on file   Other Topics Concern   • Not on file   Social History Narrative   • Not on file     Social Determinants of Health     Financial Resource Strain: Not on file   Food Insecurity: Not on file   Transportation Needs: Not on file   Physical Activity: Not on file   Stress: Not on file   Social Connections: Not on file   Intimate Partner Violence: Not on file   Housing Stability: Not on file      Social  History     Tobacco Use   Smoking Status Former Smoker   • Years: 16.00   • Types: Cigarettes   • Quit date: 1990   • Years since quittin.3   Smokeless Tobacco Never Used     Social History     Substance and Sexual Activity   Alcohol Use No     Social History     Substance and Sexual Activity   Drug Use No      Family History   Problem Relation Age of Onset   • Diabetes Mother    • Hypertension Mother    • Psychiatric Illness Father    • Diabetes Sister    • Diabetes Brother        ROS     Objective:   There were no vitals taken for this visit.    Physical Exam    Labs   Latest Reference Range & Units 22 20:30   WBC 4.8 - 10.8 K/uL 6.2   RBC 4.70 - 6.10 M/uL 4.74   Hemoglobin 14.0 - 18.0 g/dL 15.0   Hematocrit 42.0 - 52.0 % 43.7   MCV 81.4 - 97.8 fL 92.2   MCH 27.0 - 33.0 pg 31.6   MCHC 33.7 - 35.3 g/dL 34.3   RDW 35.9 - 50.0 fL 43.7   Platelet Count 164 - 446 K/uL 152 (L)   MPV 9.0 - 12.9 fL 8.8 (L)   Neutrophils-Polys 44.00 - 72.00 % 72.30 (H)   Neutrophils (Absolute) 1.82 - 7.42 K/uL 4.50 [1]   Lymphocytes 22.00 - 41.00 % 17.00 (L)   Lymphs (Absolute) 1.00 - 4.80 K/uL 1.06   Monocytes 0.00 - 13.40 % 9.50   Monos (Absolute) 0.00 - 0.85 K/uL 0.59   Eosinophils 0.00 - 6.90 % 0.60   Eos (Absolute) 0.00 - 0.51 K/uL 0.04   Basophils 0.00 - 1.80 % 0.30   Baso (Absolute) 0.00 - 0.12 K/uL 0.02   Immature Granulocytes 0.00 - 0.90 % 0.30   Immature Granulocytes (abs) 0.00 - 0.11 K/uL 0.02   Nucleated RBC /100 WBC 0.00   NRBC (Absolute) K/uL 0.00   Sodium 135 - 145 mmol/L 136   Potassium 3.6 - 5.5 mmol/L 3.7   Chloride 96 - 112 mmol/L 101   Co2 20 - 33 mmol/L 22   Anion Gap 7.0 - 16.0  13.0   Glucose 65 - 99 mg/dL 228 (H)   Bun 8 - 22 mg/dL 20   Creatinine 0.50 - 1.40 mg/dL 1.07   GFR (CKD-EPI) >60 mL/min/1.73 m 2 75 [2]   Calcium 8.4 - 10.2 mg/dL 9.5   AST(SGOT) 12 - 45 U/L 14   ALT(SGPT) 2 - 50 U/L 15   Alkaline Phosphatase 30 - 99 U/L 101 (H)   Total Bilirubin 0.1 - 1.5 mg/dL 0.3   Albumin 3.2 - 4.9 g/dL  3.9   Total Protein 6.0 - 8.2 g/dL 7.1   Globulin 1.9 - 3.5 g/dL 3.2   A-G Ratio g/dL 1.2   Lipase 7 - 58 U/L 34   (L): Data is abnormally low  (H): Data is abnormally high  [1] Includes immature neutrophils, if present.  [2] Effective 3/15/2022, estimated Glomerular Filtration Rate   is calculated using race neutral CKD-EPI 2021 equation   per NKF-ASN recommendations.         Imaging:  CT ABDOMEN (5/13/22)  IMPRESSION:  1.  No evidence of local recurrence. No metastatic disease in the chest, abdomen or pelvis.  2.  Incidental left thyroid nodules measuring up to 1 cm. They can be followed with outpatient ultrasound.    CT ABDOMEN (5/3/22)  IMPRESSION:  1.  Diffuse colonic wall thickening consistent with colitis, likely inflammatory or infectious.  2.  Normal appendix.    PET CT (10/12/21) [facility]    CT ABDOMEN (9/18/21) [facility]              Pathology:   SPECIMEN (11/4/21)    PRELIMINARY DIAGNOSIS:   A. Omentum tumor:          Pending expert consultation from [facility]          Fibrosis and inflammation rich in plasma cells with peripheral           atypical stromal cells, favor reactive   B. Retroperitoneum, posterior to tumor:          Mature fibroadipose tissue with scattered foci of fibrosis and           chronic inflammation, negative for malignancy   C. Omentum:          Mature fibroadipose tissue, negative for malignancy      Procedures:     SURGERY (11/4/21)  1.  Exploratory laparotomy.  2.  Resection of retroperitoneal margin with overlying peritoneum, less than 5 cm.  3.  Intraoperative ultrasound survey of the lateral rectus wall transverse abdominis muscle pelvis.  4.  Omentectomy due to finding the mass, which was suspicious for what was seen on CT attached to the lateral wall of the peritoneum, so we did an omentectomy.        Diagnosis:     1. Rhabdomyosarcoma of pelvis (HCC)     2. Abnormal CT of the abdomen     3. Pain due to neoplasm     4. S/P exploratory laparotomy           Medical Decision  Making:  Today's Assessment / Status / Plan:     In light of the present findings, the patient will have 1 more imaging series in 1 year.  He will follow-up with me and if this is clean then we would proceed with no more surveillance.  They understood this and agreed to the plan.  This was done via formal  on the iPad.  We will order the CT scan of the chest, abdomen and pelvis.      I, Dr. Bain have entered, reviewed and confirmed the above diagnoses related to this patient on this date of service, 5/24/2022  7:16 AM.    He agreed and verbalized his agreement and understanding with the current plan. I answered all questions and concerns he has at this time and advised him to call at any time in the interim with questions or concerns in regards to his care.    Thank you for allowing me to participate in his care, I will continue to follow closely.       Please note that this dictation was created using voice recognition software. I have made every reasonable attempt to correct obvious errors, but I expect that there are errors of grammar and possibly content that I did not discover before finalizing the note.     Thank you for this consultation and allowing me to participate in your patient's care. If I can be of further service please contact my office.

## 2022-05-24 ENCOUNTER — OFFICE VISIT (OUTPATIENT)
Dept: SURGICAL ONCOLOGY | Facility: MEDICAL CENTER | Age: 67
End: 2022-05-24
Payer: MEDICARE

## 2022-05-24 VITALS
TEMPERATURE: 97.4 F | WEIGHT: 178.9 LBS | HEIGHT: 66 IN | HEART RATE: 85 BPM | OXYGEN SATURATION: 95 % | SYSTOLIC BLOOD PRESSURE: 132 MMHG | DIASTOLIC BLOOD PRESSURE: 74 MMHG | BODY MASS INDEX: 28.75 KG/M2

## 2022-05-24 DIAGNOSIS — Z98.890 S/P EXPLORATORY LAPAROTOMY: ICD-10-CM

## 2022-05-24 DIAGNOSIS — R93.5 ABNORMAL CT OF THE ABDOMEN: ICD-10-CM

## 2022-05-24 DIAGNOSIS — G89.3 PAIN DUE TO NEOPLASM: ICD-10-CM

## 2022-05-24 PROCEDURE — 99215 OFFICE O/P EST HI 40 MIN: CPT | Performed by: SURGERY

## 2022-05-24 ASSESSMENT — FIBROSIS 4 INDEX: FIB4 SCORE: 1.62

## 2022-09-14 ENCOUNTER — OFFICE VISIT (OUTPATIENT)
Dept: URGENT CARE | Facility: PHYSICIAN GROUP | Age: 67
End: 2022-09-14
Payer: MEDICARE

## 2022-09-14 VITALS
OXYGEN SATURATION: 95 % | DIASTOLIC BLOOD PRESSURE: 72 MMHG | TEMPERATURE: 97.2 F | HEIGHT: 62 IN | WEIGHT: 175 LBS | SYSTOLIC BLOOD PRESSURE: 140 MMHG | BODY MASS INDEX: 32.2 KG/M2 | HEART RATE: 82 BPM | RESPIRATION RATE: 16 BRPM

## 2022-09-14 DIAGNOSIS — M54.41 ACUTE RIGHT-SIDED LOW BACK PAIN WITH RIGHT-SIDED SCIATICA: ICD-10-CM

## 2022-09-14 PROCEDURE — 99213 OFFICE O/P EST LOW 20 MIN: CPT

## 2022-09-14 RX ORDER — PREDNISONE 20 MG/1
40 TABLET ORAL DAILY
Qty: 10 TABLET | Refills: 0 | Status: SHIPPED | OUTPATIENT
Start: 2022-09-14 | End: 2022-09-19

## 2022-09-14 RX ORDER — NAPROXEN 500 MG/1
500 TABLET ORAL 2 TIMES DAILY WITH MEALS
Qty: 10 TABLET | Refills: 0 | Status: SHIPPED | OUTPATIENT
Start: 2022-09-14 | End: 2022-09-19

## 2022-09-14 ASSESSMENT — ENCOUNTER SYMPTOMS
CHILLS: 0
BACK PAIN: 1
FLANK PAIN: 0
WEIGHT LOSS: 0
DIAPHORESIS: 0
FEVER: 0
SENSORY CHANGE: 0

## 2022-09-14 ASSESSMENT — FIBROSIS 4 INDEX: FIB4 SCORE: 1.64

## 2022-09-14 NOTE — PROGRESS NOTES
Subjective:   Kenyon Hewitt is a 67 y.o. male who presents for Leg Pain (R lower back pain that radiates down to his knee, foot numbness at times. X 10 days)      HPI: This is a 67-year-old male who presents today with his daughter for complaints of right low back pain.  Patient is Albanian-speaking, daughter interpreting today.  Patient denies trauma or strenuous activity.  He denies trauma or injury. Pain to low back has been persistent over the last 10 days.   Pain reported is 8\10.  Pain is exacerbated with movement and walking.  He has been using cane for additional support with ambulation.  He reports associated pain going down the back of right thigh.  He has been taking Tylenol and ibuprofen for this which has been helpful.  He denies pre-existing back injuries.  No painful urination.  No fevers, chills, body aches.  Patient denies saddle anesthesia      Review of Systems   Constitutional:  Negative for chills, diaphoresis, fever, malaise/fatigue and weight loss.   Genitourinary:  Negative for dysuria, flank pain, frequency, hematuria and urgency.   Musculoskeletal:  Positive for back pain.   Neurological:  Negative for sensory change.   All other systems reviewed and are negative.    Medications:    Current Outpatient Medications on File Prior to Visit   Medication Sig Dispense Refill    metroNIDAZOLE (FLAGYL) 500 MG Tab Take 1 Tablet by mouth 3 times a day. 15 Tablet 0    finasteride (PROSCAR) 5 MG Tab Take 5 mg by mouth every day. FOR 90 DAYS      acetaminophen (TYLENOL) 500 MG Tab Take 1-2 Tablets by mouth every 6 hours as needed. 30 Tablet 0    GAVILYTE-G 236 g Recon Soln Take 4 L by mouth one time.      SYNJARDY 12.5-1000 MG Tab Take 1 Tablet by mouth 2 times a day.      atorvastatin (LIPITOR) 40 MG Tab Take 40 mg by mouth every evening.      busPIRone (BUSPAR) 10 MG Tab Take 10 mg by mouth 3 times a day as needed (For anxiety).      lisinopril (PRINIVIL) 5 MG Tab Take 5 mg by mouth every  day.      Nirmatrelvir & Ritonavir 20 x 150 MG & 10 x 100MG Tablet Therapy Pack Take 300 mg nirmatrelvir (two 150 mg tablets) with 100 mg  ritonavir (one 100 mg tablet) by mouth, with all three tablets taken together  twice daily for 5 days. (Patient not taking: Reported on 2022) 30 Each 0    acetaminophen (TYLENOL) 500 MG Tab Take 2 Tablets by mouth every 8 hours. (Patient not taking: Reported on 2022) 30 Tablet 0     No current facility-administered medications on file prior to visit.        Allergies:   Nkda [no known drug allergy]    Problem List:   Patient Active Problem List   Diagnosis    Vitiligo    Diabetes due to underlying condition w hypoglycemia w/o coma (HCC)    Hypertension    Hyperlipidemia    Headache    Stroke (HCC)    BERNADINE (obstructive sleep apnea)    Rhabdomyosarcoma of pelvis (HCC)    Pelvic pain in male    Abnormal CT of the abdomen    Pain due to neoplasm    S/P exploratory laparotomy        Surgical History:  Past Surgical History:   Procedure Laterality Date    GA EXCISION/DESTRUCTION OPEN ABDOMINAL TUMORS >* N/A 2021    Procedure: EXCISION, RETROPERITONEUM - TUMOR;  Surgeon: Tyrone Bain M.D.;  Location: [facility];  Service: General    GA EXPLORATORY OF ABDOMEN  2021    Procedure: LAPAROTOMY, EXPLORATORY;  Surgeon: Tyrone Bain M.D.;  Location: SURGERY [facility];  Service: General    GA ULTRASONIC GUIDANCE, INTRAOPERATIVE  2021    Procedure: INTRA OPERATIVE ULTRASOUND GUIDANCE;  Surgeon: Tyrone Bain M.D.;  Location: SURGERY [facility];  Service: General    GA REMOVAL OF OMENTUM  2021    Procedure: OMENTECTOMY AND OMENTAL BIOPSY;  Surgeon: Tyrone Bain M.D.;  Location: SURGERY [facility];  Service: General       Past Social Hx:   Social History     Tobacco Use    Smoking status: Former     Years: 16.00     Types: Cigarettes     Quit date: 1990     Years since quittin.7    Smokeless  "tobacco: Never   Vaping Use    Vaping Use: Never used   Substance Use Topics    Alcohol use: No    Drug use: No          Problem list, medications, and allergies reviewed by myself today in Epic.     Objective:     BP (!) 140/72 (BP Location: Right arm, Patient Position: Sitting, BP Cuff Size: Adult)   Pulse 82   Temp 36.2 °C (97.2 °F) (Temporal)   Resp 16   Ht 1.575 m (5' 2\")   Wt 79.4 kg (175 lb)   SpO2 95%   BMI 32.01 kg/m²     Physical Exam  Vitals and nursing note reviewed.   Constitutional:       General: He is awake. He is not in acute distress.     Appearance: Normal appearance. He is normal weight. He is not ill-appearing, toxic-appearing or diaphoretic.   HENT:      Head: Normocephalic and atraumatic.   Cardiovascular:      Rate and Rhythm: Normal rate.      Pulses: Normal pulses.   Pulmonary:      Effort: Pulmonary effort is normal.   Musculoskeletal:      Cervical back: Tenderness present.        Back:       Comments: Right low back: Area of subjective tenderness.  No obvious deformity, erythema, swelling, midline tenderness.5/5 to lower extremities.  Patient ambulatory with use of cane.   Skin:     General: Skin is warm and dry.      Capillary Refill: Capillary refill takes less than 2 seconds.   Neurological:      General: No focal deficit present.      Mental Status: He is alert and oriented to person, place, and time. Mental status is at baseline.      Coordination: Coordination normal.   Psychiatric:         Mood and Affect: Mood normal.         Behavior: Behavior normal. Behavior is cooperative.         Thought Content: Thought content normal.         Judgment: Judgment normal.       Assessment/Plan:     Diagnosis and associated orders:   1. Acute right-sided low back pain with right-sided sciatica  predniSONE (DELTASONE) 20 MG Tab    naproxen (NAPROSYN) 500 MG Tab             Comments/MDM:   Pt is clinically stable at today's acute urgent care visit.  No acute distress noted. Appropriate " for outpatient management at this time.     Acute problem  Prednisone 40 mg daily x5 days  Naproxen 500 mg twice daily x5 days  Heat application  Range of motion exercises and stretches  Monitor symptoms  Return for any new or worsening signs or symptoms  Follow-up with PCP for recheck             Discussed DDx, management options (risks,benefits, and alternatives to planned treatment), natural progression and supportive care.  Expressed understanding and the treatment plan was agreed upon. Questions were encouraged and answered   Return to urgent care prn if new or worsening sx or if there is no improvement in condition prn.    Educated in Red flags and indications to immediately call 911 or present to the Emergency Department.   Advised the patient to follow-up with the primary care physician for recheck, reevaluation, and consideration of further management.    I personally reviewed prior external notes and test results pertinent to today's visit.  I have independently reviewed and interpreted all diagnostics ordered during this urgent care acute visit.       Please note that this dictation was created using voice recognition software. I have made a reasonable attempt to correct obvious errors, but I expect that there are errors of grammar and possibly content that I did not discover before finalizing the note.    This note was electronically signed by LYDIA Allison

## 2022-11-02 ENCOUNTER — PATIENT MESSAGE (OUTPATIENT)
Dept: HEALTH INFORMATION MANAGEMENT | Facility: OTHER | Age: 67
End: 2022-11-02

## 2023-10-31 DIAGNOSIS — G89.3 PAIN DUE TO NEOPLASM: ICD-10-CM

## 2023-10-31 DIAGNOSIS — C49.5: ICD-10-CM

## 2023-10-31 DIAGNOSIS — R93.5 ABNORMAL CT OF THE ABDOMEN: ICD-10-CM

## 2023-10-31 DIAGNOSIS — Z98.890 S/P EXPLORATORY LAPAROTOMY: ICD-10-CM

## 2023-10-31 NOTE — PROGRESS NOTES
Subjective:      Primary care physician: Yan Powell D.O.  Referring Provider/Medical Oncologist: Luis Cantu MD     Chief Complaint: No chief complaint on file.    Diagnosis:   1. Rhabdomyosarcoma of pelvis (HCC)        2. Abnormal CT of the abdomen        3. Pain due to neoplasm        4. S/P exploratory laparotomy            History of presenting illness:  Kenyon Hewitt is a pleasant 68 y.o. male who presented with follow-up status post resection of a pseudotumor in his abdomen.  The patient was diagnosed with a mass in the abdomen was painful.  He was biopsied and came back as potentially rhabdomyosarcoma which was somewhat unusual for a 66-year-old with an intraperitoneal tumor.  During resection we found a bunch of matted omentum attached to the lateral sidewall.  There was no obvious tumor outside of that.  The omentum was resected along with the peritoneum and we explored the lateral pelvic wall and inferior pelvic wall with no sign of tumor masses we ran the small bowel as well.  The pathology from this came back as fibrotic tissue with plasma cells and peripheral atypical stromal cells.  They considered it a pseudotumor due to the inflammatory changes.  He does well.  He denies any fever chills, nausea or vomiting.  Staples are out and the incision looks great.  He did run out of tramadol so I am sending another prescription for 2 more weeks.  He is here with his wife to discuss next steps using the official  iPad.     Update 5/24/2022  CT ABDOMEN / PELVIS on 5/3/22 and 5/13/22 notes no evidence of local recurrence. No metastatic disease in the chest, abdomen or pelvis. Diffuse colonic wall thickening consistent with colitis.     He is here today for polyp surveillance after having a resection which did come back as a fibrous tumor but not a malignancy.  The patient's CT scan which I have personally reviewed shows no sign of recurrent disease or metastatic disease in the chest  abdomen pelvis.  The patient has been doing well.  He denies any fever or chills, nausea or vomiting.  No hernia has no abdominal pain and he is doing well his ECOG performance status is 0.  I did review the patient's pathology report as well as his previous imaging.    Update 11/7/23    ***    ***    Past Medical History:   Diagnosis Date    Backpain     Cancer (HCC) 2021    Sarcoma    Dental disorder     Upper and lower dentures    Diabetes     Oral medications    Diverticulosis     Glaucoma     Bilateral    Heart burn     High cholesterol     Hyperlipidemia     Hypertension     Pain 11/03/2021    Lower abdomen    Parkinson's syndrome (HCC)     Psychiatric problem     Anxiety    Sleep apnea     Does not wear CPAP; no O2    Snoring     Stroke ([facility]) 2014    Residual weakness on Right side    Type II or unspecified type diabetes mellitus without mention of complication, uncontrolled 5/18/2010    Vitiligo      Past Surgical History:   Procedure Laterality Date    MA EXCISION/DESTRUCTION OPEN ABDOMINAL TUMORS >* N/A 11/4/2021    Procedure: EXCISION, RETROPERITONEUM - TUMOR;  Surgeon: Tyrone Bain M.D.;  Location: [facility];  Service: General    MA EXPLORATORY OF ABDOMEN  11/4/2021    Procedure: LAPAROTOMY, EXPLORATORY;  Surgeon: Tyrone Bain M.D.;  Location: SUR[facility]ERY [facility];  Service: General    MA ULTRASONIC GUIDANCE, INTRAOPERATIVE  11/4/2021    Procedure: INTRA OPERATIVE ULTRASOUND GUIDANCE;  Surgeon: Tyrone Bain M.D.;  Location: [facility];  Service: General    MA REMOVAL OF OMENTUM  11/4/2021    Procedure: OMENTECTOMY AND OMENTAL BIOPSY;  Surgeon: Tyrone Bain M.D.;  Location: SURGERY [facility];  Service: General     Allergies   Allergen Reactions    Nkda [No Known Drug Allergy]      Outpatient Encounter Medications as of 11/7/2023   Medication Sig Dispense Refill    metroNIDAZOLE (FLAGYL) 500 MG Tab Take 1 Tablet by mouth 3 times a day.  15 Tablet 0    finasteride (PROSCAR) 5 MG Tab Take 5 mg by mouth every day. FOR 90 DAYS      Nirmatrelvir & Ritonavir 20 x 150 MG & 10 x 100MG Tablet Therapy Pack Take 300 mg nirmatrelvir (two 150 mg tablets) with 100 mg  ritonavir (one 100 mg tablet) by mouth, with all three tablets taken together  twice daily for 5 days. (Patient not taking: Reported on 2022) 30 Each 0    acetaminophen (TYLENOL) 500 MG Tab Take 2 Tablets by mouth every 8 hours. (Patient not taking: Reported on 2022) 30 Tablet 0    acetaminophen (TYLENOL) 500 MG Tab Take 1-2 Tablets by mouth every 6 hours as needed. 30 Tablet 0    GAVILYTE-G 236 g Recon Soln Take 4 L by mouth one time.      SYNJARDY 12.5-1000 MG Tab Take 1 Tablet by mouth 2 times a day.      atorvastatin (LIPITOR) 40 MG Tab Take 40 mg by mouth every evening.      busPIRone (BUSPAR) 10 MG Tab Take 10 mg by mouth 3 times a day as needed (For anxiety).      lisinopril (PRINIVIL) 5 MG Tab Take 5 mg by mouth every day.       No facility-administered encounter medications on file as of 2023.     Social History     Socioeconomic History    Marital status:      Spouse name: Not on file    Number of children: Not on file    Years of education: Not on file    Highest education level: Not on file   Occupational History    Not on file   Tobacco Use    Smoking status: Former     Current packs/day: 0.00     Types: Cigarettes     Start date: 1974     Quit date: 1990     Years since quittin.8    Smokeless tobacco: Never   Vaping Use    Vaping Use: Never used   Substance and Sexual Activity    Alcohol use: No    Drug use: No    Sexual activity: Not on file   Other Topics Concern    Not on file   Social History Narrative    Not on file     Social Determinants of Health     Financial Resource Strain: Not on file   Food Insecurity: Not on file   Transportation Needs: Not on file   Physical Activity: Not on file   Stress: Not on file   Social Connections: Not on file    Intimate Partner Violence: Not on file   Housing Stability: Not on file      Social History     Tobacco Use   Smoking Status Former    Current packs/day: 0.00    Types: Cigarettes    Start date: 1974    Quit date: 1990    Years since quittin.8   Smokeless Tobacco Never     Social History     Substance and Sexual Activity   Alcohol Use No     Social History     Substance and Sexual Activity   Drug Use No      Family History   Problem Relation Age of Onset    Diabetes Mother     Hypertension Mother     Psychiatric Illness Father     Diabetes Sister     Diabetes Brother          ROS     Objective:   There were no vitals taken for this visit.    Physical Exam    Labs  ***     Imaging  CT ABDOMEN (22)  IMPRESSION:  1.  No evidence of local recurrence. No metastatic disease in the chest, abdomen or pelvis.  2.  Incidental left thyroid nodules measuring up to 1 cm. They can be followed with outpatient ultrasound.     CT ABDOMEN (5/3/22)  IMPRESSION:  1.  Diffuse colonic wall thickening consistent with colitis, likely inflammatory or infectious.  2.  Normal appendix.     PET CT (10/12/21) [facility]    CT ABDOMEN (21) [facility]              Pathology:   SPECIMEN (21)    PRELIMINARY DIAGNOSIS:   A. Omentum tumor:          Pending expert consultation from [facility]          Fibrosis and inflammation rich in plasma cells with peripheral           atypical stromal cells, favor reactive   B. Retroperitoneum, posterior to tumor:          Mature fibroadipose tissue with scattered foci of fibrosis and           chronic inflammation, negative for malignancy   C. Omentum:          Mature fibroadipose tissue, negative for malignancy      Procedures:      SURGERY (21)  1.  Exploratory laparotomy.  2.  Resection of retroperitoneal margin with overlying peritoneum, less than 5 cm.  3.  Intraoperative ultrasound survey of the lateral rectus wall transverse abdominis muscle pelvis.  4.  Omentectomy due to  finding the mass, which was suspicious for what was seen on CT attached to the lateral wall of the peritoneum, so we did an omentectomy.      Diagnosis:     1. Rhabdomyosarcoma of pelvis (HCC)        2. Abnormal CT of the abdomen        3. Pain due to neoplasm        4. S/P exploratory laparotomy            Medical Decision Making:  Today's Assessment / Status / Plan:     ***

## 2023-11-01 ENCOUNTER — TELEPHONE (OUTPATIENT)
Dept: SURGICAL ONCOLOGY | Facility: MEDICAL CENTER | Age: 68
End: 2023-11-01
Payer: MEDICARE

## 2023-11-01 ENCOUNTER — APPOINTMENT (OUTPATIENT)
Dept: LAB | Facility: MEDICAL CENTER | Age: 68
End: 2023-11-01
Payer: MEDICARE

## 2023-11-01 NOTE — PROGRESS NOTES
Informed by MA that patient does not have Medicare at this time by [facility] Medicaid. This was revealed after his CT scan with [facility] was scheduled and [facility] scheduler informed patient that he no longer has medicare. [facility] has cancelled the scan. Per daughter he can have images done at [facility]. Order faxed to [facility]. Will escalate issue to practice manager given insurance concerns.

## 2023-11-07 ENCOUNTER — APPOINTMENT (OUTPATIENT)
Dept: SURGICAL ONCOLOGY | Facility: MEDICAL CENTER | Age: 68
End: 2023-11-07
Payer: MEDICARE

## 2024-01-10 ENCOUNTER — TELEPHONE (OUTPATIENT)
Dept: HEMATOLOGY ONCOLOGY | Facility: MEDICAL CENTER | Age: 69
End: 2024-01-10

## 2024-01-10 NOTE — TELEPHONE ENCOUNTER
This patient was schedule yesterday for NP with Dr. Person. Patient has [employer] Medicare plan and 2nd ins is Medicaid [facility] is not contracted with this advantage plan. I left a voice mail with patient in [place] explaining the insurances and I also speak with his daughter Griselda Nunez. Please forward the referral somewhere else for the patient. I am canceling the appointment for 01/17/2024. Thank you

## 2024-01-17 ENCOUNTER — APPOINTMENT (OUTPATIENT)
Dept: HEMATOLOGY ONCOLOGY | Facility: MEDICAL CENTER | Age: 69
End: 2024-01-17
Payer: MEDICARE

## 2025-01-24 ENCOUNTER — APPOINTMENT (OUTPATIENT)
Dept: URBAN - METROPOLITAN AREA CLINIC 4 | Facility: CLINIC | Age: 70
Setting detail: DERMATOLOGY
End: 2025-01-24

## 2025-01-24 DIAGNOSIS — L30.9 DERMATITIS, UNSPECIFIED: ICD-10-CM | Status: INADEQUATELY CONTROLLED

## 2025-01-24 DIAGNOSIS — L85.3 XEROSIS CUTIS: ICD-10-CM

## 2025-01-24 PROCEDURE — ? DIAGNOSIS COMMENT

## 2025-01-24 PROCEDURE — 99203 OFFICE O/P NEW LOW 30 MIN: CPT

## 2025-01-24 PROCEDURE — ? PRESCRIPTION

## 2025-01-24 PROCEDURE — ? COUNSELING

## 2025-01-24 PROCEDURE — ? MEDICATION COUNSELING

## 2025-01-24 RX ORDER — TRIAMCINOLONE ACETONIDE 1 MG/G
CREAM TOPICAL BID
Qty: 45 | Refills: 0 | Status: ERX | COMMUNITY
Start: 2025-01-24

## 2025-01-24 RX ADMIN — TRIAMCINOLONE ACETONIDE: 1 CREAM TOPICAL at 00:00

## 2025-01-24 ASSESSMENT — LOCATION ZONE DERM
LOCATION ZONE: HAND
LOCATION ZONE: FACE
LOCATION ZONE: FINGER

## 2025-01-24 ASSESSMENT — LOCATION SIMPLE DESCRIPTION DERM
LOCATION SIMPLE: LEFT FOREHEAD
LOCATION SIMPLE: RIGHT HAND
LOCATION SIMPLE: LEFT HAND
LOCATION SIMPLE: LEFT RING FINGER

## 2025-01-24 ASSESSMENT — LOCATION DETAILED DESCRIPTION DERM
LOCATION DETAILED: LEFT FOREHEAD
LOCATION DETAILED: LEFT PROXIMAL RADIAL DORSAL RING FINGER
LOCATION DETAILED: LEFT ULNAR DORSAL HAND
LOCATION DETAILED: RIGHT RADIAL DORSAL HAND
LOCATION DETAILED: RIGHT ULNAR DORSAL HAND

## 2025-01-24 NOTE — PROCEDURE: MEDICATION COUNSELING
Zoryve Counseling:  I discussed with the patient that Zoryve is not for use in the eyes, mouth or vagina. The most commonly reported side effects include diarrhea, headache, insomnia, application site pain, upper respiratory tract infections, and urinary tract infections.  All of the patient's questions and concerns were addressed.
Metronidazole Counseling:  I discussed with the patient the risks of metronidazole including but not limited to seizures, nausea/vomiting, a metallic taste in the mouth, nausea/vomiting and severe allergy.
Tremfya Counseling: I discussed with the patient the risks of guselkumab including but not limited to immunosuppression, serious infections, and drug reactions.  The patient understands that monitoring is required including a PPD at baseline and must alert us or the primary physician if symptoms of infection or other concerning signs are noted.
Wegovy Pregnancy And Lactation Text: The fetal risk of this medication is unknown and taking while pregnant is not recommended. It is unknown if this medication is present in breast milk.
Infliximab Pregnancy And Lactation Text: This medication is Pregnancy Category B and is considered safe during pregnancy. It is unknown if this medication is excreted in breast milk.
Itraconazole Pregnancy And Lactation Text: This medication is Pregnancy Category C and it isn't know if it is safe during pregnancy. It is also excreted in breast milk.
Minoxidil Pregnancy And Lactation Text: This medication has not been assigned a Pregnancy Risk Category but animal studies failed to show danger with the topical medication. It is unknown if the medication is excreted in breast milk.
Nsaids Counseling: NSAID Counseling: I discussed with the patient that NSAIDs should be taken with food. Prolonged use of NSAIDs can result in the development of stomach ulcers.  Patient advised to stop taking NSAIDs if abdominal pain occurs.  The patient verbalized understanding of the proper use and possible adverse effects of NSAIDs.  All of the patient's questions and concerns were addressed.
Ketoconazole Counseling:   Patient counseled regarding improving absorption with orange juice.  Adverse effects include but are not limited to breast enlargement, headache, diarrhea, nausea, upset stomach, liver function test abnormalities, taste disturbance, and stomach pain.  There is a rare possibility of liver failure that can occur when taking ketoconazole. The patient understands that monitoring of LFTs may be required, especially at baseline. The patient verbalized understanding of the proper use and possible adverse effects of ketoconazole.  All of the patient's questions and concerns were addressed.
Nsaids Pregnancy And Lactation Text: These medications are considered safe up to 30 weeks gestation. It is excreted in breast milk.
Albendazole Counseling:  I discussed with the patient the risks of albendazole including but not limited to cytopenia, kidney damage, nausea/vomiting and severe allergy.  The patient understands that this medication is being used in an off-label manner.
Zepbound Counseling: I reviewed the possible side effects including: thyroid tumors, kidney disease, gallbladder disease, abdominal pain, constipation, diarrhea, nausea, vomiting and pancreatitis. Do not take this medication if you have a history or family history of multiple endocrine neoplasia syndrome type 2. Side effects reviewed, pt to contact office should one occur.
Nemluvio Counseling: I discussed with the patient the risks of nemolizumab including but not limited to headache, gastrointestinal complaints, nasopharyngitis, musculoskeletal complaints, injection site reactions, and allergic reactions. The patient understands that monitoring is required and they must alert us or the primary physician if any side effects are noted.
Odomzo Pregnancy And Lactation Text: This medication is Pregnancy Category X and is absolutely contraindicated during pregnancy. It is unknown if it is excreted in breast milk.
Xeljanz Counseling: I discussed with the patient the risks of Xeljanz therapy including increased risk of infection, liver issues, headache, diarrhea, or cold symptoms. Live vaccines should be avoided. They were instructed to call if they have any problems.
Elidel Counseling: Patient may experience a mild burning sensation during topical application. Elidel is not approved in children less than 2 years of age. There have been case reports of hematologic and skin malignancies in patients using topical calcineurin inhibitors although causality is questionable.
Hydroxyzine Counseling: Patient advised that the medication is sedating and not to drive a car after taking this medication.  Patient informed of potential adverse effects including but not limited to dry mouth, urinary retention, and blurry vision.  The patient verbalized understanding of the proper use and possible adverse effects of hydroxyzine.  All of the patient's questions and concerns were addressed.
High Dose Vitamin A Pregnancy And Lactation Text: High dose vitamin A therapy is contraindicated during pregnancy and breast feeding.
Tazorac Counseling:  Patient advised that medication is irritating and drying.  Patient may need to apply sparingly and wash off after an hour before eventually leaving it on overnight.  The patient verbalized understanding of the proper use and possible adverse effects of tazorac.  All of the patient's questions and concerns were addressed.
Valtrex Pregnancy And Lactation Text: this medication is Pregnancy Category B and is considered safe during pregnancy. This medication is not directly found in breast milk but it's metabolite acyclovir is present.
Use Enhanced Medication Counseling?: No
Xelbirdz Pregnancy And Lactation Text: This medication is Pregnancy Category D and is not considered safe during pregnancy.  The risk during breast feeding is also uncertain.
Tazorac Pregnancy And Lactation Text: This medication is not safe during pregnancy. It is unknown if this medication is excreted in breast milk.
Cimzia Counseling:  I discussed with the patient the risks of Cimzia including but not limited to immunosuppression, allergic reactions and infections.  The patient understands that monitoring is required including a PPD at baseline and must alert us or the primary physician if symptoms of infection or other concerning signs are noted.
Metronidazole Pregnancy And Lactation Text: This medication is Pregnancy Category B and considered safe during pregnancy.  It is also excreted in breast milk.
Tremfya Pregnancy And Lactation Text: The risk during pregnancy and breastfeeding is uncertain with this medication.
Zoryve Pregnancy And Lactation Text: It is unknown if this medication can cause problems during pregnancy and breastfeeding.
Mirvaso Counseling: Mirvaso is a topical medication which can decrease superficial blood flow where applied. Side effects are uncommon and include stinging, redness and allergic reactions.
Ketoconazole Pregnancy And Lactation Text: This medication is Pregnancy Category C and it isn't know if it is safe during pregnancy. It is also excreted in breast milk and breast feeding isn't recommended.
Albendazole Pregnancy And Lactation Text: This medication is Pregnancy Category C and it isn't known if it is safe during pregnancy. It is also excreted in breast milk.
Olanzapine Counseling- I discussed with the patient the common side effects of olanzapine including but are not limited to: lack of energy, dry mouth, increased appetite, sleepiness, tremor, constipation, dizziness, changes in behavior, or restlessness.  Explained that teenagers are more likely to experience headaches, abdominal pain, pain in the arms or legs, tiredness, and sleepiness.  Serious side effects include but are not limited: increased risk of death in elderly patients who are confused, have memory loss, or dementia-related psychosis; hyperglycemia; increased cholesterol and triglycerides; and weight gain.
Nemluvio Pregnancy And Lactation Text: It is not known if Nemluvio causes fetal harm or is present in breast milk. Please proceed with caution if patients who are pregnant or breastfeeding.
Topical Clindamycin Counseling: Patient counseled that this medication may cause skin irritation or allergic reactions.  In the event of skin irritation, the patient was advised to reduce the amount of the drug applied or use it less frequently.   The patient verbalized understanding of the proper use and possible adverse effects of clindamycin.  All of the patient's questions and concerns were addressed.
Minocycline Counseling: Patient advised regarding possible photosensitivity and discoloration of the teeth, skin, lips, tongue and gums.  Patient instructed to avoid sunlight, if possible.  When exposed to sunlight, patients should wear protective clothing, sunglasses, and sunscreen.  The patient was instructed to call the office immediately if the following severe adverse effects occur:  hearing changes, easy bruising/bleeding, severe headache, or vision changes.  The patient verbalized understanding of the proper use and possible adverse effects of minocycline.  All of the patient's questions and concerns were addressed.
Mirvaso Pregnancy And Lactation Text: This medication has not been assigned a Pregnancy Risk Category. It is unknown if the medication is excreted in breast milk.
Zyclara Counseling:  I discussed with the patient the risks of imiquimod including but not limited to erythema, scaling, itching, weeping, crusting, and pain.  Patient understands that the inflammatory response to imiquimod is variable from person to person and was educated regarded proper titration schedule.  If flu-like symptoms develop, patient knows to discontinue the medication and contact us.
Xolair Counseling:  Patient informed of potential adverse effects including but not limited to fever, muscle aches, rash and allergic reactions.  The patient verbalized understanding of the proper use and possible adverse effects of Xolair.  All of the patient's questions and concerns were addressed.
Cimzia Pregnancy And Lactation Text: This medication crosses the placenta but can be considered safe in certain situations. Cimzia may be excreted in breast milk.
Zyclara Pregnancy And Lactation Text: This medication is Pregnancy Category C. It is unknown if this medication is excreted in breast milk.
Opzelura Counseling:  I discussed with the patient the risks of Opzelura including but not limited to nasopharngitis, bronchitis, ear infection, eosinophila, hives, diarrhea, folliculitis, tonsillitis, and rhinorrhea.  Taken orally, this medication has been linked to serious infections; higher rate of mortality; malignancy and lymphoproliferative disorders; major adverse cardiovascular events; thrombosis; thrombocytopenia, anemia, and neutropenia; and lipid elevations.
Terbinafine Counseling: Patient counseling regarding adverse effects of terbinafine including but not limited to headache, diarrhea, rash, upset stomach, liver function test abnormalities, itching, taste/smell disturbance, nausea, abdominal pain, and flatulence.  There is a rare possibility of liver failure that can occur when taking terbinafine.  The patient understands that a baseline LFT and kidney function test may be required. The patient verbalized understanding of the proper use and possible adverse effects of terbinafine.  All of the patient's questions and concerns were addressed.
Olanzapine Pregnancy And Lactation Text: This medication is pregnancy category C.   There are no adequate and well controlled trials with olanzapine in pregnant females.  Olanzapine should be used during pregnancy only if the potential benefit justifies the potential risk to the fetus.   In a study in lactating healthy women, olanzapine was excreted in breast milk.  It is recommended that women taking olanzapine should not breast feed.
Rituxan Counseling:  I discussed with the patient the risks of Rituxan infusions. Side effects can include infusion reactions, severe drug rashes including mucocutaneous reactions, reactivation of latent hepatitis and other infections and rarely progressive multifocal leukoencephalopathy.  All of the patient's questions and concerns were addressed.
Detail Level: Simple
Ivermectin Counseling:  Patient instructed to take medication on an empty stomach with a full glass of water.  Patient informed of potential adverse effects including but not limited to nausea, diarrhea, dizziness, itching, and swelling of the extremities or lymph nodes.  The patient verbalized understanding of the proper use and possible adverse effects of ivermectin.  All of the patient's questions and concerns were addressed.
Topical Clindamycin Pregnancy And Lactation Text: This medication is Pregnancy Category B and is considered safe during pregnancy. It is unknown if it is excreted in breast milk.
Fluconazole Counseling:  Patient counseled regarding adverse effects of fluconazole including but not limited to headache, diarrhea, nausea, upset stomach, liver function test abnormalities, taste disturbance, and stomach pain.  There is a rare possibility of liver failure that can occur when taking fluconazole.  The patient understands that monitoring of LFTs and kidney function test may be required, especially at baseline. The patient verbalized understanding of the proper use and possible adverse effects of fluconazole.  All of the patient's questions and concerns were addressed.
Xolair Pregnancy And Lactation Text: This medication is Pregnancy Category B and is considered safe during pregnancy. This medication is excreted in breast milk.
Cosentyx Counseling:  I discussed with the patient the risks of Cosentyx including but not limited to worsening of Crohn's disease, immunosuppression, allergic reactions and infections.  The patient understands that monitoring is required including a PPD at baseline and must alert us or the primary physician if symptoms of infection or other concerning signs are noted.
Minocycline Pregnancy And Lactation Text: This medication is Pregnancy Category D and not consider safe during pregnancy. It is also excreted in breast milk.
Quinolones Counseling:  I discussed with the patient the risks of fluoroquinolones including but not limited to GI upset, allergic reaction, drug rash, diarrhea, dizziness, photosensitivity, yeast infections, liver function test abnormalities, tendonitis/tendon rupture.
Terbinafine Pregnancy And Lactation Text: This medication is Pregnancy Category B and is considered safe during pregnancy. It is also excreted in breast milk and breast feeding isn't recommended.
Rituxan Pregnancy And Lactation Text: This medication is Pregnancy Category C and it isn't know if it is safe during pregnancy. It is unknown if this medication is excreted in breast milk but similar antibodies are known to be excreted.
Aklief counseling:  Patient advised to apply a pea-sized amount only at bedtime and wait 30 minutes after washing their face before applying.  If too drying, patient may add a non-comedogenic moisturizer.  The most commonly reported side effects including irritation, redness, scaling, dryness, stinging, burning, itching, and increased risk of sunburn.  The patient verbalized understanding of the proper use and possible adverse effects of retinoids.  All of the patient's questions and concerns were addressed.
Opzelura Pregnancy And Lactation Text: There is insufficient data to evaluate drug-associated risk for major birth defects, miscarriage, or other adverse maternal or fetal outcomes.  There is a pregnancy registry that monitors pregnancy outcomes in pregnant persons exposed to the medication during pregnancy.  It is unknown if this medication is excreted in breast milk.  Do not breastfeed during treatment and for about 4 weeks after the last dose.
Oral Minoxidil Counseling- I discussed with the patient the risks of oral minoxidil including but not limited to shortness of breath, swelling of the feet or ankles, dizziness, lightheadedness, unwanted hair growth and allergic reaction.  The patient verbalized understanding of the proper use and possible adverse effects of oral minoxidil.  All of the patient's questions and concerns were addressed.
Solaraze Pregnancy And Lactation Text: This medication is Pregnancy Category B and is considered safe. There is some data to suggest avoiding during the third trimester. It is unknown if this medication is excreted in breast milk.
Erivedge Counseling- I discussed with the patient the risks of Erivedge including but not limited to nausea, vomiting, diarrhea, constipation, weight loss, changes in the sense of taste, decreased appetite, muscle spasms, and hair loss.  The patient verbalized understanding of the proper use and possible adverse effects of Erivedge.  All of the patient's questions and concerns were addressed.
Opioid Counseling: I discussed with the patient the potential side effects of opioids including but not limited to addiction, altered mental status, and depression. I stressed avoiding alcohol, benzodiazepines, muscle relaxants and sleep aids unless specifically okayed by a physician. The patient verbalized understanding of the proper use and possible adverse effects of opioids. All of the patient's questions and concerns were addressed. They were instructed to flush the remaining pills down the toilet if they did not need them for pain.
Olumiant Pregnancy And Lactation Text: Based on animal studies, Olumiant may cause embryo-fetal harm when administered to pregnant women.  The medication should not be used in pregnancy.  Breastfeeding is not recommended during treatment.
Thalidomide Counseling: I discussed with the patient the risks of thalidomide including but not limited to birth defects, anxiety, weakness, chest pain, dizziness, cough and severe allergy.
Cantharidin Counseling:  I discussed with the patient the risks of Cantharidin including but not limited to pain, redness, burning, itching, and blistering.
Spevigo Pregnancy And Lactation Text: The risk during pregnancy and breastfeeding is uncertain with this medication. This medication does cross the placenta. It is unknown if this medication is found in breast milk.
Wartpeel Pregnancy And Lactation Text: This medication is Pregnancy Category X and contraindicated in pregnancy and in women who may become pregnant. It is unknown if this medication is excreted in breast milk.
Methotrexate Counseling:  Patient counseled regarding adverse effects of methotrexate including but not limited to nausea, vomiting, abnormalities in liver function tests. Patients may develop mouth sores, rash, diarrhea, and abnormalities in blood counts. The patient understands that monitoring is required including LFT's and blood counts.  There is a rare possibility of scarring of the liver and lung problems that can occur when taking methotrexate. Persistent nausea, loss of appetite, pale stools, dark urine, cough, and shortness of breath should be reported immediately. Patient advised to discontinue methotrexate treatment at least three months before attempting to become pregnant.  I discussed the need for folate supplements while taking methotrexate.  These supplements can decrease side effects during methotrexate treatment. The patient verbalized understanding of the proper use and possible adverse effects of methotrexate.  All of the patient's questions and concerns were addressed.
Clindamycin Pregnancy And Lactation Text: This medication can be used in pregnancy if certain situations. Clindamycin is also present in breast milk.
Methotrexate Pregnancy And Lactation Text: This medication is Pregnancy Category X and is known to cause fetal harm. This medication is excreted in breast milk.
Winlevi Counseling:  I discussed with the patient the risks of topical clascoterone including but not limited to erythema, scaling, itching, and stinging. Patient voiced their understanding.
Doxycycline Counseling:  Patient counseled regarding possible photosensitivity and increased risk for sunburn.  Patient instructed to avoid sunlight, if possible.  When exposed to sunlight, patients should wear protective clothing, sunglasses, and sunscreen.  The patient was instructed to call the office immediately if the following severe adverse effects occur:  hearing changes, easy bruising/bleeding, severe headache, or vision changes.  The patient verbalized understanding of the proper use and possible adverse effects of doxycycline.  All of the patient's questions and concerns were addressed.
Adbry Counseling: I discussed with the patient the risks of tralokinumab including but not limited to eye infection and irritation, cold sores, injection site reactions, worsening of asthma, allergic reactions and increased risk of parasitic infection.  Live vaccines should be avoided while taking tralokinumab. The patient understands that monitoring is required and they must alert us or the primary physician if symptoms of infection or other concerning signs are noted.
Dapsone Pregnancy And Lactation Text: This medication is Pregnancy Category C and is not considered safe during pregnancy or breast feeding.
Finasteride Male Counseling: Finasteride Counseling:  I discussed with the patient the risks of use of finasteride including but not limited to decreased libido, decreased ejaculate volume, gynecomastia, and depression. Women should not handle medication.  All of the patient's questions and concerns were addressed.
Bexarotene Counseling:  I discussed with the patient the risks of bexarotene including but not limited to hair loss, dry lips/skin/eyes, liver abnormalities, hyperlipidemia, pancreatitis, depression/suicidal ideation, photosensitivity, drug rash/allergic reactions, hypothyroidism, anemia, leukopenia, infection, cataracts, and teratogenicity.  Patient understands that they will need regular blood tests to check lipid profile, liver function tests, white blood cell count, thyroid function tests and pregnancy test if applicable.
Low Dose Naltrexone Counseling- I discussed with the patient the potential risks and side effects of low dose naltrexone including but not limited to: more vivid dreams, headaches, nausea, vomiting, abdominal pain, fatigue, dizziness, and anxiety.
Finasteride Female Counseling: Finasteride Counseling:  I discussed with the patient the risks of use of finasteride including but not limited to decreased libido and sexual dysfunction. Explained the teratogenic nature of the medication and stressed the importance of not getting pregnant during treatment. All of the patient's questions and concerns were addressed.
Bexarotene Pregnancy And Lactation Text: This medication is Pregnancy Category X and should not be given to women who are pregnant or may become pregnant. This medication should not be used if you are breast feeding.
Low Dose Naltrexone Pregnancy And Lactation Text: Naltrexone is pregnancy category C.  There have been no adequate and well-controlled studies in pregnant women.  It should be used in pregnancy only if the potential benefit justifies the potential risk to the fetus.   Limited data indicates that naltrexone is minimally excreted into breastmilk.
Ilumya Counseling: I discussed with the patient the risks of tildrakizumab including but not limited to immunosuppression, malignancy, posterior leukoencephalopathy syndrome, and serious infections.  The patient understands that monitoring is required including a PPD at baseline and must alert us or the primary physician if symptoms of infection or other concerning signs are noted.
5-Fu Counseling: 5-Fluorouracil Counseling:  I discussed with the patient the risks of 5-fluorouracil including but not limited to erythema, scaling, itching, weeping, crusting, and pain.
Gabapentin Counseling: I discussed with the patient the risks of gabapentin including but not limited to dizziness, somnolence, fatigue and ataxia.
Rinvoq Counseling: I discussed with the patient the risks of Rinvoq therapy including but not limited to upper respiratory tract infections, shingles, cold sores, bronchitis, nausea, cough, fever, acne, and headache. Live vaccines should be avoided.  This medication has been linked to serious infections; higher rate of mortality; malignancy and lymphoproliferative disorders; major adverse cardiovascular events; thrombosis; thrombocytopenia, anemia, and neutropenia; lipid elevations; liver enzyme elevations; and gastrointestinal perforations.
Stelara Counseling:  I discussed with the patient the risks of ustekinumab including but not limited to immunosuppression, malignancy, posterior leukoencephalopathy syndrome, and serious infections.  The patient understands that monitoring is required including a PPD at baseline and must alert us or the primary physician if symptoms of infection or other concerning signs are noted.
Cimetidine Counseling:  I discussed with the patient the risks of Cimetidine including but not limited to gynecomastia, headache, diarrhea, nausea, drowsiness, arrhythmias, pancreatitis, skin rashes, psychosis, bone marrow suppression and kidney toxicity.
Soolantra Counseling: I discussed with the patients the risks of topial Soolantra. This is a medicine which decreases the number of mites and inflammation in the skin. You experience burning, stinging, eye irritation or allergic reactions.  Please call our office if you develop any problems from using this medication.
Opioid Pregnancy And Lactation Text: These medications can lead to premature delivery and should be avoided during pregnancy. These medications are also present in breast milk in small amounts.
Doxycycline Pregnancy And Lactation Text: This medication is Pregnancy Category D and not consider safe during pregnancy. It is also excreted in breast milk but is considered safe for shorter treatment courses.
Rinvoq Pregnancy And Lactation Text: Based on animal studies, Rinvoq may cause embryo-fetal harm when administered to pregnant women.  The medication should not be used in pregnancy.  Breastfeeding is not recommended during treatment and for 6 days after the last dose.
Tranexamic Acid Counseling:  Patient advised of the small risk of bleeding problems with tranexamic acid. They were also instructed to call if they developed any nausea, vomiting or diarrhea. All of the patient's questions and concerns were addressed.
Soolantra Pregnancy And Lactation Text: This medication is Pregnancy Category C. This medication is considered safe during breast feeding.
Libtayo Counseling- I discussed with the patient the risks of Libtayo including but not limited to nausea, vomiting, diarrhea, and bone or muscle pain.  The patient verbalized understanding of the proper use and possible adverse effects of Libtayo.  All of the patient's questions and concerns were addressed.
Semaglutide Counseling: I reviewed the possible side effects including: thyroid tumors, kidney disease, gallbladder disease, abdominal pain, constipation, diarrhea, nausea, vomiting and pancreatitis. Do not take this medication if you have a history or family history of multiple endocrine neoplasia syndrome type 2. Side effects reviewed, pt to contact office should one occur.
Prednisone Counseling:  I discussed with the patient the risks of prolonged use of prednisone including but not limited to weight gain, insomnia, osteoporosis, mood changes, diabetes, susceptibility to infection, glaucoma and high blood pressure.  In cases where prednisone use is prolonged, patients should be monitored with blood pressure checks, serum glucose levels and an eye exam.  Additionally, the patient may need to be placed on GI prophylaxis, PCP prophylaxis, and calcium and vitamin D supplementation and/or a bisphosphonate.  The patient verbalized understanding of the proper use and the possible adverse effects of prednisone.  All of the patient's questions and concerns were addressed.
Adbry Pregnancy And Lactation Text: It is unknown if this medication will adversely affect pregnancy or breast feeding.
Winlevi Pregnancy And Lactation Text: This medication is considered safe during pregnancy and breastfeeding.
Klisyri Counseling:  I discussed with the patient the risks of Klisyri including but not limited to erythema, scaling, itching, weeping, crusting, and pain.
Niacinamide Counseling: I recommended taking niacin or niacinamide, also know as vitamin B3, twice daily. Recent evidence suggests that taking vitamin B3 (500 mg twice daily) can reduce the risk of actinic keratoses and non-melanoma skin cancers. Side effects of vitamin B3 include flushing and headache.
Klisyri Pregnancy And Lactation Text: It is unknown if this medication can harm a developing fetus or if it is excreted in breast milk.
Isotretinoin Counseling: Patient should get monthly blood tests, not donate blood, not drive at night if vision affected, not share medication, and not undergo elective surgery for 6 months after tx completed. Side effects reviewed, pt to contact office should one occur.
Griseofulvin Pregnancy And Lactation Text: This medication is Pregnancy Category X and is known to cause serious birth defects. It is unknown if this medication is excreted in breast milk but breast feeding should be avoided.
Gabapentin Pregnancy And Lactation Text: This medication is Pregnancy Category C and isn't considered safe during pregnancy. It is excreted in breast milk.
Finasteride Pregnancy And Lactation Text: This medication is absolutely contraindicated during pregnancy. It is unknown if it is excreted in breast milk.
Drysol Counseling:  I discussed with the patient the risks of drysol/aluminum chloride including but not limited to skin rash, itching, irritation, burning.
Tranexamic Acid Pregnancy And Lactation Text: It is unknown if this medication is safe during pregnancy or breast feeding.
Libtayo Pregnancy And Lactation Text: This medication is contraindicated in pregnancy and when breast feeding.
Sotyktu Counseling:  I discussed the most common side effects of Sotyktu including: common cold, sore throat, sinus infections, cold sores, canker sores, folliculitis, and acne.  I also discussed more serious side effects of Sotyktu including but not limited to: serious allergic reactions; increased risk for infections such as TB; cancers such as lymphomas; rhabdomyolysis and elevated CPK; and elevated triglycerides and liver enzymes. 
Doxepin Counseling:  Patient advised that the medication is sedating and not to drive a car after taking this medication. Patient informed of potential adverse effects including but not limited to dry mouth, urinary retention, and blurry vision.  The patient verbalized understanding of the proper use and possible adverse effects of doxepin.  All of the patient's questions and concerns were addressed.
Bimzelx Counseling:  I discussed with the patient the risks of Bimzelx including but not limited to depression, immunosuppression, allergic reactions and infections.  The patient understands that monitoring is required including a PPD at baseline and must alert us or the primary physician if symptoms of infection or other concerning signs are noted.
Erythromycin Counseling:  I discussed with the patient the risks of erythromycin including but not limited to GI upset, allergic reaction, drug rash, diarrhea, increase in liver enzymes, and yeast infections.
VTAMA Counseling: I discussed with the patient that VTAMA is not for use in the eyes, mouth or mouth. They should call the office if they develop any signs of allergic reactions to VTAMA. The patient verbalized understanding of the proper use and possible adverse effects of VTAMA.  All of the patient's questions and concerns were addressed.
Taltz Counseling: I discussed with the patient the risks of ixekizumab including but not limited to immunosuppression, serious infections, worsening of inflammatory bowel disease and drug reactions.  The patient understands that monitoring is required including a PPD at baseline and must alert us or the primary physician if symptoms of infection or other concerning signs are noted.
Prednisone Pregnancy And Lactation Text: This medication is Pregnancy Category C and it isn't know if it is safe during pregnancy. This medication is excreted in breast milk.
Minoxidil Counseling: Minoxidil is a topical medication which can increase blood flow where it is applied. It is uncertain how this medication increases hair growth. Side effects are uncommon and include stinging and allergic reactions.
Niacinamide Pregnancy And Lactation Text: These medications are considered safe during pregnancy.
Wegovy Counseling: I reviewed the possible side effects including: thyroid tumors, kidney disease, gallbladder disease, abdominal pain, constipation, diarrhea, nausea, vomiting and pancreatitis. Do not take this medication if you have a history or family history of multiple endocrine neoplasia syndrome type 2. Side effects reviewed, pt to contact office should one occur.
Bimzelx Pregnancy And Lactation Text: This medication crosses the placenta and the safety is uncertain during pregnancy. It is unknown if this medication is present in breast milk.
Itraconazole Counseling:  I discussed with the patient the risks of itraconazole including but not limited to liver damage, nausea/vomiting, neuropathy, and severe allergy.  The patient understands that this medication is best absorbed when taken with acidic beverages such as non-diet cola or ginger ale.  The patient understands that monitoring is required including baseline LFTs and repeat LFTs at intervals.  The patient understands that they are to contact us or the primary physician if concerning signs are noted.
Topical Retinoid counseling:  Patient advised to apply a pea-sized amount only at bedtime and wait 30 minutes after washing their face before applying.  If too drying, patient may add a non-comedogenic moisturizer. The patient verbalized understanding of the proper use and possible adverse effects of retinoids.  All of the patient's questions and concerns were addressed.
Infliximab Counseling:  I discussed with the patient the risks of infliximab including but not limited to myelosuppression, immunosuppression, autoimmune hepatitis, demyelinating diseases, lymphoma, and serious infections.  The patient understands that monitoring is required including a PPD at baseline and must alert us or the primary physician if symptoms of infection or other concerning signs are noted.
Isotretinoin Pregnancy And Lactation Text: This medication is Pregnancy Category X and is considered extremely dangerous during pregnancy. It is unknown if it is excreted in breast milk.
Birth Control Pills Counseling: Birth Control Pill Counseling: I discussed with the patient the potential side effects of OCPs including but not limited to increased risk of stroke, heart attack, thrombophlebitis, deep venous thrombosis, hepatic adenomas, breast changes, GI upset, headaches, and depression.  The patient verbalized understanding of the proper use and possible adverse effects of OCPs. All of the patient's questions and concerns were addressed.
High Dose Vitamin A Counseling: Side effects reviewed, pt to contact office should one occur.
Valtrex Counseling: I discussed with the patient the risks of valacyclovir including but not limited to kidney damage, nausea, vomiting and severe allergy.  The patient understands that if the infection seems to be worsening or is not improving, they are to call.
Odomzo Counseling- I discussed with the patient the risks of Odomzo including but not limited to nausea, vomiting, diarrhea, constipation, weight loss, changes in the sense of taste, decreased appetite, muscle spasms, and hair loss.  The patient verbalized understanding of the proper use and possible adverse effects of Odomzo.  All of the patient's questions and concerns were addressed.
Drysol Pregnancy And Lactation Text: This medication is considered safe during pregnancy and breast feeding.
Doxepin Pregnancy And Lactation Text: This medication is Pregnancy Category C and it isn't known if it is safe during pregnancy. It is also excreted in breast milk and breast feeding isn't recommended.
Erythromycin Pregnancy And Lactation Text: This medication is Pregnancy Category B and is considered safe during pregnancy. It is also excreted in breast milk.
Sotyktu Pregnancy And Lactation Text: There is insufficient data to evaluate whether or not Sotyktu is safe to use during pregnancy.   It is not known if Sotyktu passes into breast milk and whether or not it is safe to use when breastfeeding.  
Clofazimine Counseling:  I discussed with the patient the risks of clofazimine including but not limited to skin and eye pigmentation, liver damage, nausea/vomiting, gastrointestinal bleeding and allergy.
Enbrel Counseling:  I discussed with the patient the risks of etanercept including but not limited to myelosuppression, immunosuppression, autoimmune hepatitis, demyelinating diseases, lymphoma, and infections.  The patient understands that monitoring is required including a PPD at baseline and must alert us or the primary physician if symptoms of infection or other concerning signs are noted.
Qbrexza Pregnancy And Lactation Text: There is no available data on Qbrexza use in pregnant women.  There is no available data on Qbrexza use in lactation.
Propranolol Counseling:  I discussed with the patient the risks of propranolol including but not limited to low heart rate, low blood pressure, low blood sugar, restlessness and increased cold sensitivity. They should call the office if they experience any of these side effects.
Carac Counseling:  I discussed with the patient the risks of Carac including but not limited to erythema, scaling, itching, weeping, crusting, and pain.
Cibinqo Pregnancy And Lactation Text: It is unknown if this medication will adversely affect pregnancy or breast feeding.  You should not take this medication if you are currently pregnant or planning a pregnancy or while breastfeeding.
Eucrisa Counseling: Patient may experience a mild burning sensation during topical application. Eucrisa is not approved in children less than 3 months of age.
Topical Steroids Applications Pregnancy And Lactation Text: Most topical steroids are considered safe to use during pregnancy and lactation.  Any topical steroid applied to the breast or nipple should be washed off before breastfeeding.
Cyclophosphamide Counseling:  I discussed with the patient the risks of cyclophosphamide including but not limited to hair loss, hormonal abnormalities, decreased fertility, abdominal pain, diarrhea, nausea and vomiting, bone marrow suppression and infection. The patient understands that monitoring is required while taking this medication.
Bactrim Pregnancy And Lactation Text: This medication is Pregnancy Category D and is known to cause fetal risk.  It is also excreted in breast milk.
Topical Sulfur Applications Counseling: Topical Sulfur Counseling: Patient counseled that this medication may cause skin irritation or allergic reactions.  In the event of skin irritation, the patient was advised to reduce the amount of the drug applied or use it less frequently.   The patient verbalized understanding of the proper use and possible adverse effects of topical sulfur application.  All of the patient's questions and concerns were addressed.
Cyclophosphamide Pregnancy And Lactation Text: This medication is Pregnancy Category D and it isn't considered safe during pregnancy. This medication is excreted in breast milk.
Tetracycline Counseling: Patient counseled regarding possible photosensitivity and increased risk for sunburn.  Patient instructed to avoid sunlight, if possible.  When exposed to sunlight, patients should wear protective clothing, sunglasses, and sunscreen.  The patient was instructed to call the office immediately if the following severe adverse effects occur:  hearing changes, easy bruising/bleeding, severe headache, or vision changes.  The patient verbalized understanding of the proper use and possible adverse effects of tetracycline.  All of the patient's questions and concerns were addressed. Patient understands to avoid pregnancy while on therapy due to potential birth defects.
Glycopyrrolate Counseling:  I discussed with the patient the risks of glycopyrrolate including but not limited to skin rash, drowsiness, dry mouth, difficulty urinating, and blurred vision.
Dutasteride Male Counseling: Dustasteride Counseling:  I discussed with the patient the risks of use of dutasteride including but not limited to decreased libido, decreased ejaculate volume, and gynecomastia. Women who can become pregnant should not handle medication.  All of the patient's questions and concerns were addressed.
Humira Counseling:  I discussed with the patient the risks of adalimumab including but not limited to myelosuppression, immunosuppression, autoimmune hepatitis, demyelinating diseases, lymphoma, and serious infections.  The patient understands that monitoring is required including a PPD at baseline and must alert us or the primary physician if symptoms of infection or other concerning signs are noted.
Colchicine Counseling:  Patient counseled regarding adverse effects including but not limited to stomach upset (nausea, vomiting, stomach pain, or diarrhea).  Patient instructed to limit alcohol consumption while taking this medication.  Colchicine may reduce blood counts especially with prolonged use.  The patient understands that monitoring of kidney function and blood counts may be required, especially at baseline. The patient verbalized understanding of the proper use and possible adverse effects of colchicine.  All of the patient's questions and concerns were addressed.
Litfulo Counseling: I discussed with the patient the risks of Litfulo therapy including but not limited to upper respiratory tract infections, shingles, cold sores, and nausea. Live vaccines should be avoided.  This medication has been linked to serious infections; higher rate of mortality; malignancy and lymphoproliferative disorders; major adverse cardiovascular events; thrombosis; gastrointestinal perforations; neutropenia; lymphopenia; anemia; liver enzyme elevations; and lipid elevations.
Skyrizi Counseling: I discussed with the patient the risks of risankizumab-rzaa including but not limited to immunosuppression, and serious infections.  The patient understands that monitoring is required including a PPD at baseline and must alert us or the primary physician if symptoms of infection or other concerning signs are noted.
Cephalexin Counseling: I counseled the patient regarding use of cephalexin as an antibiotic for prophylactic and/or therapeutic purposes. Cephalexin (commonly prescribed under brand name Keflex) is a cephalosporin antibiotic which is active against numerous classes of bacteria, including most skin bacteria. Side effects may include nausea, diarrhea, gastrointestinal upset, rash, hives, yeast infections, and in rare cases, hepatitis, kidney disease, seizures, fever, confusion, neurologic symptoms, and others. Patients with severe allergies to penicillin medications are cautioned that there is about a 10% incidence of cross-reactivity with cephalosporins. When possible, patients with penicillin allergies should use alternatives to cephalosporins for antibiotic therapy.
Rhofade Counseling: Rhofade is a topical medication which can decrease superficial blood flow where applied. Side effects are uncommon and include stinging, redness and allergic reactions.
Propranolol Pregnancy And Lactation Text: This medication is Pregnancy Category C and it isn't known if it is safe during pregnancy. It is excreted in breast milk.
Cephalexin Pregnancy And Lactation Text: This medication is Pregnancy Category B and considered safe during pregnancy.  It is also excreted in breast milk but can be used safely for shorter doses.
Litfulo Pregnancy And Lactation Text: Based on animal studies, Lifulo may cause embryo-fetal harm when administered to pregnant women.  The medication should not be used in pregnancy.  Breastfeeding is not recommended during treatment.
Birth Control Pills Pregnancy And Lactation Text: This medication should be avoided if pregnant and for the first 30 days post-partum.
Ozempic Counseling: I reviewed the possible side effects including: thyroid tumors, kidney disease, gallbladder disease, abdominal pain, constipation, diarrhea, nausea, vomiting and pancreatitis. Do not take this medication if you have a history or family history of multiple endocrine neoplasia syndrome type 2. Side effects reviewed, pt to contact office should one occur.
Cyclosporine Counseling:  I discussed with the patient the risks of cyclosporine including but not limited to hypertension, gingival hyperplasia,myelosuppression, immunosuppression, liver damage, kidney damage, neurotoxicity, lymphoma, and serious infections. The patient understands that monitoring is required including baseline blood pressure, CBC, CMP, lipid panel and uric acid, and then 1-2 times monthly CMP and blood pressure.
Topical Sulfur Applications Pregnancy And Lactation Text: This medication is considered safe during pregnancy and breast feeding secondary to limited systemic absorption.
Hydroquinone Counseling:  Patient advised that medication may result in skin irritation, lightening (hypopigmentation), dryness, and burning.  In the event of skin irritation, the patient was advised to reduce the amount of the drug applied or use it less frequently.  Rarely, spots that are treated with hydroquinone can become darker (pseudoochronosis).  Should this occur, patient instructed to stop medication and call the office. The patient verbalized understanding of the proper use and possible adverse effects of hydroquinone.  All of the patient's questions and concerns were addressed.
Glycopyrrolate Pregnancy And Lactation Text: This medication is Pregnancy Category B and is considered safe during pregnancy. It is unknown if it is excreted breast milk.
Dutasteride Female Counseling: Dutasteride Counseling:  I discussed with the patient the risks of use of dutasteride including but not limited to decreased libido and sexual dysfunction. Explained the teratogenic nature of the medication and stressed the importance of not getting pregnant during treatment. All of the patient's questions and concerns were addressed.
Hydroxychloroquine Counseling:  I discussed with the patient that a baseline ophthalmologic exam is needed at the start of therapy and every year thereafter while on therapy. A CBC may also be warranted for monitoring.  The side effects of this medication were discussed with the patient, including but not limited to agranulocytosis, aplastic anemia, seizures, rashes, retinopathy, and liver toxicity. Patient instructed to call the office should any adverse effect occur.  The patient verbalized understanding of the proper use and possible adverse effects of Plaquenil.  All the patient's questions and concerns were addressed.
Spironolactone Counseling: Patient advised regarding risks of diarrhea, abdominal pain, hyperkalemia, birth defects (for female patients), liver toxicity and renal toxicity. The patient may need blood work to monitor liver and kidney function and potassium levels while on therapy. The patient verbalized understanding of the proper use and possible adverse effects of spironolactone.  All of the patient's questions and concerns were addressed.
Wartpeel Counseling:  I discussed with the patient the risks of Wartpeel including but not limited to erythema, scaling, itching, weeping, crusting, and pain.
Calcipotriene Counseling:  I discussed with the patient the risks of calcipotriene including but not limited to erythema, scaling, itching, and irritation.
SSKI Counseling:  I discussed with the patient the risks of SSKI including but not limited to thyroid abnormalities, metallic taste, GI upset, fever, headache, acne, arthralgias, paraesthesias, lymphadenopathy, easy bleeding, arrhythmias, and allergic reaction.
Acitretin Counseling:  I discussed with the patient the risks of acitretin including but not limited to hair loss, dry lips/skin/eyes, liver damage, hyperlipidemia, depression/suicidal ideation, photosensitivity.  Serious rare side effects can include but are not limited to pancreatitis, pseudotumor cerebri, bony changes, clot formation/stroke/heart attack.  Patient understands that alcohol is contraindicated since it can result in liver toxicity and significantly prolong the elimination of the drug by many years.
Calcipotriene Pregnancy And Lactation Text: The use of this medication during pregnancy or lactation is not recommended as there is insufficient data.
Sski Pregnancy And Lactation Text: This medication is Pregnancy Category D and isn't considered safe during pregnancy. It is excreted in breast milk.
Olumiant Counseling: I discussed with the patient the risks of Olumiant therapy including but not limited to upper respiratory tract infections, shingles, cold sores, and nausea. Live vaccines should be avoided.  This medication has been linked to serious infections; higher rate of mortality; malignancy and lymphoproliferative disorders; major adverse cardiovascular events; thrombosis; gastrointestinal perforations; neutropenia; lymphopenia; anemia; liver enzyme elevations; and lipid elevations.
Clindamycin Counseling: I counseled the patient regarding use of clindamycin as an antibiotic for prophylactic and/or therapeutic purposes. Clindamycin is active against numerous classes of bacteria, including skin bacteria. Side effects may include nausea, diarrhea, gastrointestinal upset, rash, hives, yeast infections, and in rare cases, colitis.
Spevigo Counseling: I discussed with the patient the risks of Spevigo including but not limited to fatigue, nasuea, vomiting, headache, pruritus, urinary tract infection, an infusion related reactions.  The patient understands that monitoring is required including screening for tuberculosis at baseline and yearly screening thereafter while continuing Spevigo therapy. They should contact us if symptoms of infection or other concerning signs are noted.
Imiquimod Counseling:  I discussed with the patient the risks of imiquimod including but not limited to erythema, scaling, itching, weeping, crusting, and pain.  Patient understands that the inflammatory response to imiquimod is variable from person to person and was educated regarded proper titration schedule.  If flu-like symptoms develop, patient knows to discontinue the medication and contact us.
Saxenda Counseling: I reviewed the possible side effects including: thyroid tumors, kidney disease, gallbladder disease, abdominal pain, constipation, diarrhea, nausea, vomiting and pancreatitis. Do not take this medication if you have a history or family history of multiple endocrine neoplasia syndrome type 2. Side effects reviewed, pt to contact office should one occur.
Hydroxychloroquine Pregnancy And Lactation Text: This medication has been shown to cause fetal harm but it isn't assigned a Pregnancy Risk Category. There are small amounts excreted in breast milk.
Spironolactone Pregnancy And Lactation Text: This medication can cause feminization of the male fetus and should be avoided during pregnancy. The active metabolite is also found in breast milk.
Dapsone Counseling: I discussed with the patient the risks of dapsone including but not limited to hemolytic anemia, agranulocytosis, rashes, methemoglobinemia, kidney failure, peripheral neuropathy, headaches, GI upset, and liver toxicity.  Patients who start dapsone require monitoring including baseline LFTs and weekly CBCs for the first month, then every month thereafter.  The patient verbalized understanding of the proper use and possible adverse effects of dapsone.  All of the patient's questions and concerns were addressed.
Dutasteride Pregnancy And Lactation Text: This medication is absolutely contraindicated in women, especially during pregnancy and breast feeding. Feminization of male fetuses is possible if taking while pregnant.
Solaraze Counseling:  I discussed with the patient the risks of Solaraze including but not limited to erythema, scaling, itching, weeping, crusting, and pain.
Hyrimoz Counseling:  I discussed with the patient the risks of adalimumab including but not limited to myelosuppression, immunosuppression, autoimmune hepatitis, demyelinating diseases, lymphoma, and serious infections.  The patient understands that monitoring is required including a PPD at baseline and must alert us or the primary physician if symptoms of infection or other concerning signs are noted.
Acitretin Pregnancy And Lactation Text: This medication is Pregnancy Category X and should not be given to women who are pregnant or may become pregnant in the future. This medication is excreted in breast milk.
Aklief Pregnancy And Lactation Text: It is unknown if this medication is safe to use during pregnancy.  It is unknown if this medication is excreted in breast milk.  Breastfeeding women should use the topical cream on the smallest area of the skin for the shortest time needed while breastfeeding.  Do not apply to nipple and areola.
Oral Minoxidil Pregnancy And Lactation Text: This medication should only be used when clearly needed if you are pregnant, attempting to become pregnant or breast feeding.
Picato Counseling:  I discussed with the patient the risks of Picato including but not limited to erythema, scaling, itching, weeping, crusting, and pain.
Siliq Counseling:  I discussed with the patient the risks of Siliq including but not limited to new or worsening depression, suicidal thoughts and behavior, immunosuppression, malignancy, posterior leukoencephalopathy syndrome, and serious infections.  The patient understands that monitoring is required including a PPD at baseline and must alert us or the primary physician if symptoms of infection or other concerning signs are noted. There is also a special program designed to monitor depression which is required with Siliq.
Topical Ketoconazole Counseling: Patient counseled that this medication may cause skin irritation or allergic reactions.  In the event of skin irritation, the patient was advised to reduce the amount of the drug applied or use it less frequently.   The patient verbalized understanding of the proper use and possible adverse effects of ketoconazole.  All of the patient's questions and concerns were addressed.
Griseofulvin Counseling:  I discussed with the patient the risks of griseofulvin including but not limited to photosensitivity, cytopenia, liver damage, nausea/vomiting and severe allergy.  The patient understands that this medication is best absorbed when taken with a fatty meal (e.g., ice cream or french fries).
Cantharidin Pregnancy And Lactation Text: This medication has not been proven safe during pregnancy. It is unknown if this medication is excreted in breast milk.
Dupixent Counseling: I discussed with the patient the risks of dupilumab including but not limited to eye inflammation and irritation, cold sores, injection site reactions, allergic reactions and increased risk of parasitic infection. The patient understands that monitoring is required and they must alert us or the primary physician if symptoms of infection or other concerning signs are noted.
Azelaic Acid Counseling: Patient counseled that medicine may cause skin irritation and to avoid applying near the eyes.  In the event of skin irritation, the patient was advised to reduce the amount of the drug applied or use it less frequently.   The patient verbalized understanding of the proper use and possible adverse effects of azelaic acid.  All of the patient's questions and concerns were addressed.
Otezla Counseling: The side effects of Otezla were discussed with the patient, including but not limited to worsening or new depression, weight loss, diarrhea, nausea, upper respiratory tract infection, and headache. Patient instructed to call the office should any adverse effect occur.  The patient verbalized understanding of the proper use and possible adverse effects of Otezla.  All the patient's questions and concerns were addressed.
Azathioprine Counseling:  I discussed with the patient the risks of azathioprine including but not limited to myelosuppression, immunosuppression, hepatotoxicity, lymphoma, and infections.  The patient understands that monitoring is required including baseline LFTs, Creatinine, possible TPMP genotyping and weekly CBCs for the first month and then every 2 weeks thereafter.  The patient verbalized understanding of the proper use and possible adverse effects of azathioprine.  All of the patient's questions and concerns were addressed.
Topical Metronidazole Counseling: Metronidazole is a topical antibiotic medication. You may experience burning, stinging, redness, or allergic reactions.  Please call our office if you develop any problems from using this medication.
Dupixent Pregnancy And Lactation Text: This medication likely crosses the placenta but the risk for the fetus is uncertain. This medication is excreted in breast milk.
Rifampin Counseling: I discussed with the patient the risks of rifampin including but not limited to liver damage, kidney damage, red-orange body fluids, nausea/vomiting and severe allergy.
Protopic Counseling: Patient may experience a mild burning sensation during topical application. Protopic is not approved in children less than 2 years of age. There have been case reports of hematologic and skin malignancies in patients using topical calcineurin inhibitors although causality is questionable.
Otezla Pregnancy And Lactation Text: This medication is Pregnancy Category C and it isn't known if it is safe during pregnancy. It is unknown if it is excreted in breast milk.
Arava Counseling:  Patient counseled regarding adverse effects of Arava including but not limited to nausea, vomiting, abnormalities in liver function tests. Patients may develop mouth sores, rash, diarrhea, and abnormalities in blood counts. The patient understands that monitoring is required including LFTs and blood counts.  There is a rare possibility of scarring of the liver and lung problems that can occur when taking methotrexate. Persistent nausea, loss of appetite, pale stools, dark urine, cough, and shortness of breath should be reported immediately. Patient advised to discontinue Arava treatment and consult with a physician prior to attempting conception. The patient will have to undergo a treatment to eliminate Arava from the body prior to conception.
Simlandi Counseling:  I discussed with the patient the risks of adalimumab including but not limited to myelosuppression, immunosuppression, autoimmune hepatitis, demyelinating diseases, lymphoma, and serious infections.  The patient understands that monitoring is required including a PPD at baseline and must alert us or the primary physician if symptoms of infection or other concerning signs are noted.
Azithromycin Counseling:  I discussed with the patient the risks of azithromycin including but not limited to GI upset, allergic reaction, drug rash, diarrhea, and yeast infections.
Azathioprine Pregnancy And Lactation Text: This medication is Pregnancy Category D and isn't considered safe during pregnancy. It is unknown if this medication is excreted in breast milk.
Cellcept Counseling:  I discussed with the patient the risks of mycophenolate mofetil including but not limited to infection/immunosuppression, GI upset, hypokalemia, hypercholesterolemia, bone marrow suppression, lymphoproliferative disorders, malignancy, GI ulceration/bleed/perforation, colitis, interstitial lung disease, kidney failure, progressive multifocal leukoencephalopathy, and birth defects.  The patient understands that monitoring is required including a baseline creatinine and regular CBC testing. In addition, patient must alert us immediately if symptoms of infection or other concerning signs are noted.
Azithromycin Pregnancy And Lactation Text: This medication is considered safe during pregnancy and is also secreted in breast milk.
Ebglyss Counseling: I discussed with the patient the risks of lebrikizumab including but not limited to eye inflammation and irritation, cold sores, injection site reactions, allergic reactions and increased risk of parasitic infection. The patient understands that monitoring is required and they must alert us or the primary physician if symptoms of infection or other concerning signs are noted.
Topical Metronidazole Pregnancy And Lactation Text: This medication is Pregnancy Category B and considered safe during pregnancy.  It is also considered safe to use while breastfeeding.
Rifampin Pregnancy And Lactation Text: This medication is Pregnancy Category C and it isn't know if it is safe during pregnancy. It is also excreted in breast milk and should not be used if you are breast feeding.
Sarecycline Counseling: Patient advised regarding possible photosensitivity and discoloration of the teeth, skin, lips, tongue and gums.  Patient instructed to avoid sunlight, if possible.  When exposed to sunlight, patients should wear protective clothing, sunglasses, and sunscreen.  The patient was instructed to call the office immediately if the following severe adverse effects occur:  hearing changes, easy bruising/bleeding, severe headache, or vision changes.  The patient verbalized understanding of the proper use and possible adverse effects of sarecycline.  All of the patient's questions and concerns were addressed.
Ebglyss Pregnancy And Lactation Text: This medication likely crosses the placenta but the risk for the fetus is uncertain. It is unknown if this medication is excreted in breast milk.
Benzoyl Peroxide Counseling: Patient counseled that medicine may cause skin irritation and bleach clothing.  In the event of skin irritation, the patient was advised to reduce the amount of the drug applied or use it less frequently.   The patient verbalized understanding of the proper use and possible adverse effects of benzoyl peroxide.  All of the patient's questions and concerns were addressed.
Protopic Pregnancy And Lactation Text: This medication is Pregnancy Category C. It is unknown if this medication is excreted in breast milk when applied topically.
Oxybutynin Counseling:  I discussed with the patient the risks of oxybutynin including but not limited to skin rash, drowsiness, dry mouth, difficulty urinating, and blurred vision.
Benzoyl Peroxide Pregnancy And Lactation Text: This medication is Pregnancy Category C. It is unknown if benzoyl peroxide is excreted in breast milk.
Cibinqo Counseling: I discussed with the patient the risks of Cibinqo therapy including but not limited to common cold, nausea, headache, cold sores, increased blood CPK levels, dizziness, UTIs, fatigue, acne, and vomitting. Live vaccines should be avoided.  This medication has been linked to serious infections; higher rate of mortality; malignancy and lymphoproliferative disorders; major adverse cardiovascular events; thrombosis; thrombocytopenia and lymphopenia; lipid elevations; and retinal detachment.
Qbrexza Counseling:  I discussed with the patient the risks of Qbrexza including but not limited to headache, mydriasis, blurred vision, dry eyes, nasal dryness, dry mouth, dry throat, dry skin, urinary hesitation, and constipation.  Local skin reactions including erythema, burning, stinging, and itching can also occur.
Bactrim Counseling:  I discussed with the patient the risks of sulfa antibiotics including but not limited to GI upset, allergic reaction, drug rash, diarrhea, dizziness, photosensitivity, and yeast infections.  Rarely, more serious reactions can occur including but not limited to aplastic anemia, agranulocytosis, methemoglobinemia, blood dyscrasias, liver or kidney failure, lung infiltrates or desquamative/blistering drug rashes.
Hydroxyzine Pregnancy And Lactation Text: This medication is not safe during pregnancy and should not be taken. It is also excreted in breast milk and breast feeding isn't recommended.
Simponi Counseling:  I discussed with the patient the risks of golimumab including but not limited to myelosuppression, immunosuppression, autoimmune hepatitis, demyelinating diseases, lymphoma, and serious infections.  The patient understands that monitoring is required including a PPD at baseline and must alert us or the primary physician if symptoms of infection or other concerning signs are noted.
Topical Steroids Counseling: I discussed with the patient that prolonged use of topical steroids can result in the increased appearance of superficial blood vessels (telangiectasias), lightening (hypopigmentation) and thinning of the skin (atrophy).  Patient understands to avoid using high potency steroids in skin folds, the groin or the face.  The patient verbalized understanding of the proper use and possible adverse effects of topical steroids.  All of the patient's questions and concerns were addressed.

## 2025-01-24 NOTE — PROCEDURE: DIAGNOSIS COMMENT
Render Risk Assessment In Note?: no
Detail Level: Detailed
Comment: Discussed that no primary lesions are noted today, just excoriations, so a biopsy would likely just show trauma or ulceration. Will treat with topical steroid and emollient and f/u in a few weeks. Of note, all excoriations are overlying areas of patient’s vitiligo (forehead and dorsal hands) with significant background xerosis.

## 2025-01-28 RX ORDER — TRIAMCINOLONE ACETONIDE 1 MG/G
CREAM TOPICAL BID
Qty: 45 | Refills: 0 | Status: ERX

## (undated) DEVICE — GLOVE BIOGEL PI ORTHO SZ 7.5 PF LF (40PR/BX)

## (undated) DEVICE — CHLORAPREP 26 ML APPLICATOR - ORANGE TINT(25/CA)

## (undated) DEVICE — BLADE SURGICAL #15 - (50/BX 3BX/CA)

## (undated) DEVICE — SUTURE 4-0 PROLENE SH 36 (36PK/BX)"

## (undated) DEVICE — CLIP LG INTNL HRZN TI ESCP LGT - (20/BX)

## (undated) DEVICE — BLANKET WARMING UPPER BODY - (10/CA)

## (undated) DEVICE — SLEEVE, VASO, THIGH, MED

## (undated) DEVICE — HEAD HOLDER JUNIOR/ADULT

## (undated) DEVICE — SUCTION INSTRUMENT YANKAUER BULBOUS TIP W/O VENT (50EA/CA)

## (undated) DEVICE — SUTURE 1 PDS II PLUS TP-1 - (12PK/BX)

## (undated) DEVICE — ELECTRODE DUAL RETURN W/ CORD - (50/PK)

## (undated) DEVICE — TUBE E-T HI-LO CUFF 8.0MM (10EA/PK)

## (undated) DEVICE — PACK MAJOR BASIN - (2EA/CA)

## (undated) DEVICE — GUIDE TRACHE TUBE INTUBATING STYLET 5.0-10.0MM 14FR (20EA/PK)

## (undated) DEVICE — SUTURE 4-0 PROLENE RB-1 D/A 36 (36PK/BX)"

## (undated) DEVICE — SUTURE 2-0 ETHILON FS - (36/BX) 18 INCH

## (undated) DEVICE — DRAPE LAPAROTOMY T SHEET - (12EA/CA)

## (undated) DEVICE — GOWN SURGICAL X-LARGE ULTRA - FILM-REINFORCED (20/CA)

## (undated) DEVICE — SENSOR SPO2 NEO LNCS ADHESIVE (20/BX) SEE USER NOTES

## (undated) DEVICE — PROTECTOR ULNA NERVE - (36PR/CA)

## (undated) DEVICE — SURGIFOAM (SIZE 100) - (6EA/CA)

## (undated) DEVICE — SPONGE GAUZESTER 4 X 4 4PLY - (128PK/CA)

## (undated) DEVICE — CANISTER SUCTION 3000ML MECHANICAL FILTER AUTO SHUTOFF MEDI-VAC NONSTERILE LF DISP  (40EA/CA)

## (undated) DEVICE — DRAPESURG STERI-DRAPE LONG - (10/BX 4BX/CA)

## (undated) DEVICE — SUTURE 3-0 SILK SH (12PK/BX)

## (undated) DEVICE — KIT ANESTHESIA W/CIRCUIT & 3/LT BAG W/FILTER (20EA/CA)

## (undated) DEVICE — SET LEADWIRE 5 LEAD BEDSIDE DISPOSABLE ECG (1SET OF 5/EA)

## (undated) DEVICE — BAG SPONGE COUNT 10.25 X 32 - BLUE (250/CA)

## (undated) DEVICE — DRAPE LARGE 3 QUARTER - (20/CA)

## (undated) DEVICE — STAPLER SKIN DISP - (6/BX 10BX/CA) VISISTAT

## (undated) DEVICE — TOWEL STOP TIMEOUT SAFETY FLAG (40EA/CA)

## (undated) DEVICE — DRAPE IOBAN II INCISE 23X17 - (10EA/BX 4BX/CA)

## (undated) DEVICE — PAD LAP STERILE 18 X 18 - (5/PK 40PK/CA)

## (undated) DEVICE — MASK ANESTHESIA ADULT  - (100/CA)

## (undated) DEVICE — SET EXTENSION WITH 2 PORTS (48EA/CA) ***PART #2C8610 IS A SUBSTITUTE*****

## (undated) DEVICE — BOVIE  BLADE 6 EXTENDED - (50/PK)

## (undated) DEVICE — SPONGE XRAY 8X4 STERL. 12PL - (10EA/TY 80TY/CA)

## (undated) DEVICE — WATER IRRIGATION STERILE 1000ML (12EA/CA)

## (undated) DEVICE — GLOVE BIOGEL SZ 8 SURGICAL PF LTX - (50PR/BX 4BX/CA)

## (undated) DEVICE — SUTURE GENERAL

## (undated) DEVICE — ELECTRODE 850 FOAM ADHESIVE - HYDROGEL RADIOTRNSPRNT (50/PK)

## (undated) DEVICE — TRAY SURESTEP FOLEY TEMP SENSING 16FR (10EA/CA) ORDER  #18764 FOR TEMP FOLEY ONLY

## (undated) DEVICE — VESSELOOP MAXI BLUE STERILE- SURG-I-LOOP (10EA/BX)

## (undated) DEVICE — GLOVE BIOGEL PI ORTHO SZ 7 PF LF (40PR/BX)

## (undated) DEVICE — LACTATED RINGERS INJ 1000 ML - (14EA/CA 60CA/PF)

## (undated) DEVICE — DRESSING LEUKOMED STERILE 11.75X4IN - (50/CA)

## (undated) DEVICE — CLIP MED INTNL HRZN TI ESCP - (25/BX)

## (undated) DEVICE — GLOVE SZ 6.5 BIOGEL PI MICRO - PF LF (50PR/BX)

## (undated) DEVICE — HANDPIECE THUNDERBEAT 5MM 20CM FRONT GRIP TYPE S (5EA/BX)

## (undated) DEVICE — TUBE CONNECT SUCTION CLEAR 120 X 1/4" (50EA/CA)"

## (undated) DEVICE — CLIP MED LG INTNL HRZN TI ESCP - (20/BX)

## (undated) DEVICE — SPONGE LAP XR ST 36X36 LF - (1/PK40PK/CA)

## (undated) DEVICE — TUBING CLEARLINK DUO-VENT - C-FLO (48EA/CA)

## (undated) DEVICE — DRAIN PENROSE STERILE 1/4 X - 18 IN  (25EA/BX)

## (undated) DEVICE — GOWN WARMING STANDARD FLEX - (30/CA)

## (undated) DEVICE — NEPTUNE 4 PORT MANIFOLD - (20/PK)

## (undated) DEVICE — BLANKET WARMING LOWER BODY (10EA/CA)

## (undated) DEVICE — GOWN SURGEONS X-LARGE - DISP. (30/CA)

## (undated) DEVICE — SUTURE 2-0 SILK SH 30 IN C/R (12PK/BX)

## (undated) DEVICE — CORETEMP DRAPE FORM-FITTED EASY DROPANDGO DRAPE FOR USE ON THE CORETEMP FLUID MANAGEMENT 56IN X 56IN